# Patient Record
Sex: MALE | Race: WHITE | Employment: OTHER | URBAN - METROPOLITAN AREA
[De-identification: names, ages, dates, MRNs, and addresses within clinical notes are randomized per-mention and may not be internally consistent; named-entity substitution may affect disease eponyms.]

---

## 2017-10-04 DIAGNOSIS — M25.562 LEFT KNEE PAIN, UNSPECIFIED CHRONICITY: Primary | ICD-10-CM

## 2017-10-04 DIAGNOSIS — M25.561 RIGHT KNEE PAIN, UNSPECIFIED CHRONICITY: ICD-10-CM

## 2017-10-04 PROCEDURE — 73560 X-RAY EXAM OF KNEE 1 OR 2: CPT | Performed by: ORTHOPAEDIC SURGERY

## 2019-04-26 ENCOUNTER — HOSPITAL ENCOUNTER (OUTPATIENT)
Dept: PHYSICAL THERAPY | Age: 69
Setting detail: THERAPIES SERIES
Discharge: HOME | End: 2019-04-26
Payer: MEDICARE

## 2019-04-26 PROCEDURE — G8979 MOBILITY GOAL STATUS: HCPCS

## 2019-04-26 PROCEDURE — 97140 MANUAL THERAPY 1/> REGIONS: CPT

## 2019-04-26 PROCEDURE — 97110 THERAPEUTIC EXERCISES: CPT

## 2019-04-26 PROCEDURE — G8978 MOBILITY CURRENT STATUS: HCPCS

## 2019-04-26 PROCEDURE — G0283 ELEC STIM OTHER THAN WOUND: HCPCS

## 2019-04-26 PROCEDURE — 97161 PT EVAL LOW COMPLEX 20 MIN: CPT

## 2019-04-26 NOTE — PLAN OF CARE
infection otherwise. Will monitor. Gait: (include devices/WB status) decreased knee f/e s AD    Orthopedic Special Tests: none                       [x] Patient history, allergies, meds reviewed. Medical chart reviewed. See intake form. Review Of Systems (ROS):  [x]Performed Review of systems (Integumentary, CardioPulmonary, Neurological) by intake and observation. Intake form has been scanned into medical record. Patient has been instructed to contact their primary care physician regarding ROS issues if not already being addressed at this time. Co-morbidities/Complexities (which will affect course of rehabilitation):   []None           Arthritic conditions   []Rheumatoid arthritis (M05.9)  [x]Osteoarthritis (M19.91)- L knee   Cardiovascular conditions   [x]Hypertension (I10)  [x]Hyperlipidemia (E78.5)  []Angina pectoris (I20)  []Atherosclerosis (I70)   Musculoskeletal conditions   []Disc pathology   []Congenital spine pathologies   []Prior surgical intervention  []Osteoporosis (M81.8)  []Osteopenia (M85.8)   Endocrine conditions   []Hypothyroid (E03.9)  []Hyperthyroid Gastrointestinal conditions   []Constipation (R05.86)   Metabolic conditions   []Morbid obesity (E66.01)  []Diabetes type 1(E10.65) or 2 (E11.65)   []Neuropathy (G60.9)     Pulmonary conditions   []Asthma (J45)  []Coughing   []COPD (J44.9)   Psychological Disorders  []Anxiety (F41.9)  []Depression (F32.9)   []Other:   [x]Other:  LBP        Barriers to/and or personal factors that will affect rehab potential:              []Age  []Sex              []Motivation/Lack of Motivation                        [x]Co-Morbidities              []Cognitive Function, education/learning barriers              []Environmental, home barriers              []profession/work barriers  [x]past PT/medical experience  [x]other:fear of PROM  Justification:     Falls Risk Assessment (30 days):   [x] Falls Risk assessed and no intervention required.   [] Falls Risk then 2 days/week x 2-5 weeks  Interventions:  [x]  Therapeutic exercise including: strength training, ROM, for Lower extremity and core   [x]  NMR activation and proprioception for LE, Glutes and Core   [x]  Manual therapy as indicated for LE, Hip and spine to include: Dry Needling/IASTM, STM, PROM, Gr I-IV mobilizations, manipulation. [x] Modalities as needed that may include: thermal agents, E-stim, Biofeedback, US, iontophoresis as indicated  [x] Patient education on joint protection, postural re-education, activity modification, progression of HEP. HEP instruction: (see scanned forms)    GOALS:  Patient stated goal:Get back to normal, be able to golf, walk distances, kneel on the floor    Therapist goals for Patient:   Short Term Goals: To be achieved in: 2 weeks  1. Independent in HEP and progression per patient tolerance, in order to prevent re-injury. 2. Patient will have a decrease in pain to facilitate improvement in movement, function, and ADLs as indicated by Functional Deficits. Long Term Goals: To be achieved in: 8 weeks  1. Disability index score of 25% or less for the LEFS to assist with reaching prior level of function. 2. Patient will demonstrate increased L knee AROM to 0-115 to allow for proper joint functioning for normal gait, reciprocal stair amb, squatting and kneeling. 3. Patient will demonstrate an increase in Strength to 5/5 for the right knee and hip musculature in order to be able to walk prolonged distances, squat, and do heavier household work. 4. Patient will return to walking at least 1 mile with a normal gait pattern and back to his part-time job without limitations. 5. Patient will have improved balance to 20\" on the RLE to prevent future falls.      Electronically signed by:  Claribel Mohr, PT, DPT

## 2019-04-26 NOTE — FLOWSHEET NOTE
[facility], [address] [place]  Phone: [phone]  Fax [phone]    Physical Therapy Daily Treatment Note  Date:  2019    Patient Name:  Geovanna Veras    :  1950  MRN: [id number]  Restrictions/Precautions:    Medical/Treatment Diagnosis Information:  · Diagnosis: M17.9 Degenerative joint disease, knee, right; R TKR 19  · Treatment Diagnosis: M25.561 Right knee pain; M25.661 Right knee stiffness; R26.2 Difficulty in walking  Insurance/Certification information:  PT Insurance Information: [facility]  [facility]  Physician Information:  Referring Practitioner: NURY Mg  Plan of care signed (Y/N):     Date of Patient follow up with Physician:     G-Code (if applicable):      Date G-Code Applied:  19  PT G-Codes  Functional Assessment Tool Used: LEFS  Score: 44  Functional Limitation: Mobility: Walking and moving around  Mobility: Walking and Moving Around Current Status (): At least 40 percent but less than 60 percent impaired, limited or restricted  Mobility: Walking and Moving Around Goal Status (): At least 20 percent but less than 40 percent impaired, limited or restricted    Progress Note: [x]  Yes  []  No  Next due by: Visit #10       Latex Allergy:  [x]NO      []YES  Preferred Language for Healthcare:   [x]English       []other:    Visit # Insurance Allowable Requires auth   1 BMN    [x]no        []yes:       Pain level:  0-6/10     SUBJECTIVE:  See eval    OBJECTIVE: See eval  Observation:   Test measurements:      RESTRICTIONS/PRECAUTIONS: R TKR 2019, R THR         Exercises/Interventions:     Therapeutic Ex Sets/reps Notes HEP   Pt ed: will need to work on ROM diligently to gain functional ROM. If he'd prefer to not have PROM, then he needs to work hard at home. Rec 3x/week d/t stiffness. May decrease freq as able. SS of infection and wound care.   10'     Star stretch EOB 30\"x3  X   Heel slide with strap 5\"x10  X   HS stretch standing with step 30\"x3  X   Calf stretch standing 30\"x3  X   Prone hang 3' Add wt NV X   Quad set- propped on 1/2 FR 10\"x10  X   SLR- propped on 1/2 FR 2x10 QS each time X                     Manual Intervention      Patella mobs- all 20x ea     Post tib mob GII 15\"x4     STM vastus lateralis and IT band, VMO  Roller to IT band 5'  3'                       NMR re-education      Gait training s AD- focus on heel-toe gait 50'                                       Therapeutic Exercise and NMR EXR  [x] (20332) Provided verbal/tactile cueing for activities related to strengthening, flexibility, endurance, ROM for improvements in LE, proximal hip, and core control with self care, mobility, lifting, ambulation.  [] (51170) Provided verbal/tactile cueing for activities related to improving balance, coordination, kinesthetic sense, posture, motor skill, proprioception  to assist with LE, proximal hip, and core control in self care, mobility, lifting, ambulation and eccentric single leg control.      NMR and Therapeutic Activities:    [] (93293 or 75340) Provided verbal/tactile cueing for activities related to improving balance, coordination, kinesthetic sense, posture, motor skill, proprioception and motor activation to allow for proper function of core, proximal hip and LE with self care and ADLs  [x] (46922) Gait Re-education- Provided training and instruction to the patient for proper LE, core and proximal hip recruitment and positioning and eccentric body weight control with ambulation re-education including up and down stairs     Home Exercise Program:    [x] (10965) Reviewed/Progressed HEP activities related to strengthening, flexibility, endurance, ROM of core, proximal hip and LE for functional self-care, mobility, lifting and ambulation/stair navigation   [] (79337)Reviewed/Progressed HEP activities related to improving balance, coordination, kinesthetic sense, posture, motor skill, proprioception of core, proximal hip and LE for self care, mobility, lifting, and ambulation/stair navigation      Manual Treatments:  PROM / STM / Oscillations-Mobs:  G-I, II, III, IV (PA's, Inf., Post.)  [x] (05302) Provided manual therapy to mobilize LE, proximal hip and/or LS spine soft tissue/joints for the purpose of modulating pain, promoting relaxation,  increasing ROM, reducing/eliminating soft tissue swelling/inflammation/restriction, improving soft tissue extensibility and allowing for proper ROM for normal function with self care, mobility, lifting and ambulation. Modalities:  HV + GR x 15' R knee   Charges:  Timed Code Treatment Minutes: 30   Total Treatment Minutes: 75     [x] EVAL (LOW) 79085 (typically 20 minutes face-to-face)  [] EVAL (MOD) 80652 (typically 30 minutes face-to-face)  [] EVAL (HIGH) 82213 (typically 45 minutes face-to-face)  [] RE-EVAL     [x] JA(37730) x  1   [] IONTO  [] NMR (37206) x      [x] VASO concurrent with ES  [x] Manual (26732) x  1    [] Other:  [] TA x       [] Mech Traction (00884)  [] ES(attended) (49205)      [x] ES (un) (51464):     GOALS: Patient stated goal:Get back to normal, be able to golf, walk distances, kneel on the floor     Therapist goals for Patient:   Short Term Goals: To be achieved in: 2 weeks  1. Independent in HEP and progression per patient tolerance, in order to prevent re-injury. 2. Patient will have a decrease in pain to facilitate improvement in movement, function, and ADLs as indicated by Functional Deficits.     Long Term Goals: To be achieved in: 8 weeks  1. Disability index score of 25% or less for the LEFS to assist with reaching prior level of function. 2. Patient will demonstrate increased L knee AROM to 0-115 to allow for proper joint functioning for normal gait, reciprocal stair amb, squatting and kneeling.    3. Patient will demonstrate an increase in Strength to 5/5 for the right knee and hip musculature in order to be able to walk prolonged distances, squat, and do heavier household work. 4. Patient will return to walking at least 1 mile with a normal gait pattern and back to his part-time job without limitations. 5. Patient will have improved balance to 20\" on the RLE to prevent future falls. Progression Towards Functional goals:  [] Patient is progressing as expected towards functional goals listed. [] Progression is slowed due to complexities listed. [] Progression has been slowed due to co-morbidities.   [x] Plan just implemented, too soon to assess goals progression  [] Other:     ASSESSMENT:  See eval    Treatment/Activity Tolerance:  [] Patient tolerated treatment well [] Patient limited by fatique  [x] Patient limited by pain  [] Patient limited by other medical complications  [] Other:     Prognosis: [x] Good [] Fair  [] Poor    Patient Requires Follow-up: [x] Yes  [] No    PLAN: See eval  [] Continue per plan of care [] Alter current plan (see comments)  [x] Plan of care initiated [] Hold pending MD visit [] Discharge    Electronically signed by: Ethan Malone PT

## 2019-04-29 ENCOUNTER — HOSPITAL ENCOUNTER (OUTPATIENT)
Dept: PHYSICAL THERAPY | Age: 69
Setting detail: THERAPIES SERIES
Discharge: HOME | End: 2019-04-29
Payer: MEDICARE

## 2019-04-29 PROCEDURE — 97140 MANUAL THERAPY 1/> REGIONS: CPT

## 2019-04-29 PROCEDURE — G0283 ELEC STIM OTHER THAN WOUND: HCPCS

## 2019-04-29 PROCEDURE — 97110 THERAPEUTIC EXERCISES: CPT

## 2019-04-29 NOTE — FLOWSHEET NOTE
[facility], [address] [place] [address]  Phone: [phone]  Fax [phone]    Physical Therapy Daily Treatment Note  Date:  2019    Patient Name:  Mirza Hernandez    :  1950  MRN: [id number]  Restrictions/Precautions:    Medical/Treatment Diagnosis Information:  · Diagnosis: M17.9 Degenerative joint disease, knee, right; R TKR 19  · Treatment Diagnosis: M25.561 Right knee pain; M25.661 Right knee stiffness; R26.2 Difficulty in walking  Insurance/Certification information:  PT Insurance Information: [facility]  [facility]  Physician Information:  Referring Practitioner: NURY Craig  Plan of care signed (Y/N):     Date of Patient follow up with Physician:     G-Code (if applicable):      Date G-Code Applied:  19  PT G-Codes  Functional Assessment Tool Used: LEFS  Score: 44  Functional Limitation: Mobility: Walking and moving around  Mobility: Walking and Moving Around Current Status (): At least 40 percent but less than 60 percent impaired, limited or restricted  Mobility: Walking and Moving Around Goal Status (): At least 20 percent but less than 40 percent impaired, limited or restricted    Progress Note: [x]  Yes  []  No  Next due by: Visit #10       Latex Allergy:  [x]NO      []YES  Preferred Language for Healthcare:   [x]English       []other:    Visit # Insurance Allowable Requires auth   2 BMN    [x]no        []yes:       Pain level:  0-6/10     SUBJECTIVE: Pt reports no new complaints. Was able to stretch twice each day over the weekend. Has a point of pain when he bends his knee, but once he gets past it, he feels better.      OBJECTIVE:   Observation: incision healing improved since LV  Test measurements:  Knee flexion 81 deg after esau stretch, improves to 87 after AAROM/PROM  Knee ext 2 deg after prone hang, improves to 0 after PROM    RESTRICTIONS/PRECAUTIONS: R TKR 2019, R THR 2007        Exercises/Interventions:     Therapeutic Ex Sets/reps Notes HEP   Pt ed: still stretch 3x/day even on days in therap 2'     Star stretch EOB 30\"x3  X   Heel slide with strap on SWB  On mat 5\"x10  \"   PROM last 3 X   HS stretch standing with step 30\"x3  X   Calf stretch standing on incline board 30\"x3  X   Prone hang 3' 3# X   Quad set- propped on 1/2 FR 10\"x10  X   SAQ 3\" 2x10     LAQ 3\"2x10     SLR- propped on 1/2 FR 2x10 QS each time X   Bridge 3\"x15     Step up fwd  Up and over 2x10   x10 4\"                            EZ stretch flex, ext NV     Manual Intervention      Patella mobs- all 20x ea     Post tib mob GII 15\"x4     STM vastus lateralis and IT band, VMO  Roller to IT band 5'  3'                       NMR re-education      Gait training s AD- focus on heel-toe gait                                        Therapeutic Exercise and NMR EXR  [x] (80000) Provided verbal/tactile cueing for activities related to strengthening, flexibility, endurance, ROM for improvements in LE, proximal hip, and core control with self care, mobility, lifting, ambulation.  [] (23596) Provided verbal/tactile cueing for activities related to improving balance, coordination, kinesthetic sense, posture, motor skill, proprioception  to assist with LE, proximal hip, and core control in self care, mobility, lifting, ambulation and eccentric single leg control.      NMR and Therapeutic Activities:    [] (29916 or 11584) Provided verbal/tactile cueing for activities related to improving balance, coordination, kinesthetic sense, posture, motor skill, proprioception and motor activation to allow for proper function of core, proximal hip and LE with self care and ADLs  [x] (49131) Gait Re-education- Provided training and instruction to the patient for proper LE, core and proximal hip recruitment and positioning and eccentric body weight control with ambulation re-education including up and down stairs     Home Exercise Program: [x] (46276) Reviewed/Progressed HEP activities related to strengthening, flexibility, endurance, ROM of core, proximal hip and LE for functional self-care, mobility, lifting and ambulation/stair navigation   [] (47369)Reviewed/Progressed HEP activities related to improving balance, coordination, kinesthetic sense, posture, motor skill, proprioception of core, proximal hip and LE for self care, mobility, lifting, and ambulation/stair navigation      Manual Treatments:  PROM / STM / Oscillations-Mobs:  G-I, II, III, IV (PA's, Inf., Post.)  [x] (66522) Provided manual therapy to mobilize LE, proximal hip and/or LS spine soft tissue/joints for the purpose of modulating pain, promoting relaxation,  increasing ROM, reducing/eliminating soft tissue swelling/inflammation/restriction, improving soft tissue extensibility and allowing for proper ROM for normal function with self care, mobility, lifting and ambulation. Modalities:  HV + GR x 15' R knee   Charges:  Timed Code Treatment Minutes: 45   Total Treatment Minutes: 60     [] EVAL (LOW) 84567 (typically 20 minutes face-to-face)  [] EVAL (MOD) 86569 (typically 30 minutes face-to-face)  [] EVAL (HIGH) 78652 (typically 45 minutes face-to-face)  [] RE-EVAL     [x] GX(10683) x  2   [] IONTO  [] NMR (02342) x      [x] VASO concurrent with ES  [x] Manual (58252) x  1    [] Other:  [] TA x       [] Mech Traction (67011)  [] ES(attended) (23793)      [x] ES (un) (14920):     GOALS: Patient stated goal:Get back to normal, be able to golf, walk distances, kneel on the floor     Therapist goals for Patient:   Short Term Goals: To be achieved in: 2 weeks  1. Independent in HEP and progression per patient tolerance, in order to prevent re-injury. 2. Patient will have a decrease in pain to facilitate improvement in movement, function, and ADLs as indicated by Functional Deficits.     Long Term Goals: To be achieved in: 8 weeks  1.  Disability index score of 25% or less for the LEFS to assist with reaching prior level of function. 2. Patient will demonstrate increased L knee AROM to 0-115 to allow for proper joint functioning for normal gait, reciprocal stair amb, squatting and kneeling. 3. Patient will demonstrate an increase in Strength to 5/5 for the right knee and hip musculature in order to be able to walk prolonged distances, squat, and do heavier household work. 4. Patient will return to walking at least 1 mile with a normal gait pattern and back to his part-time job without limitations. 5. Patient will have improved balance to 20\" on the RLE to prevent future falls. Progression Towards Functional goals:  [] Patient is progressing as expected towards functional goals listed. [] Progression is slowed due to complexities listed. [] Progression has been slowed due to co-morbidities. [x] Plan just implemented, too soon to assess goals progression  [] Other:     ASSESSMENT:Pt has made gains in ROM since stretching regularly with HEP. However, he will benefit from using the EZ stretch NV.      Treatment/Activity Tolerance:  [] Patient tolerated treatment well [] Patient limited by fatique  [x] Patient limited by pain  [] Patient limited by other medical complications  [] Other:     Prognosis: [x] Good [] Fair  [] Poor    Patient Requires Follow-up: [x] Yes  [] No    PLAN: EZ stretch NV  [x] Continue per plan of care [] Alter current plan (see comments)  [] Plan of care initiated [] Hold pending MD visit [] Discharge    Electronically signed by: Goyo Lowe, PT

## 2019-05-01 ENCOUNTER — HOSPITAL ENCOUNTER (OUTPATIENT)
Dept: PHYSICAL THERAPY | Age: 69
Setting detail: THERAPIES SERIES
Discharge: HOME | End: 2019-05-01
Payer: COMMERCIAL

## 2019-05-01 PROCEDURE — 97110 THERAPEUTIC EXERCISES: CPT

## 2019-05-01 PROCEDURE — 97140 MANUAL THERAPY 1/> REGIONS: CPT

## 2019-05-01 NOTE — FLOWSHEET NOTE
[facility]  Phone: [phone]  Fax [phone]    Physical Therapy Daily Treatment Note  Date:  2019    Patient Name:  Jordyn Donahue    :  1950  MRN: [id number]  Restrictions/Precautions:    Medical/Treatment Diagnosis Information:  · Diagnosis: M17.9 Degenerative joint disease, knee, right; R TKR 19  · Treatment Diagnosis: M25.561 Right knee pain; M25.661 Right knee stiffness; R26.2 Difficulty in walking  Insurance/Certification information:  PT Insurance Information: [facility]  [facility]  Physician Information:  Referring Practitioner: NURY Fine  Plan of care signed (Y/N):     Date of Patient follow up with Physician:     G-Code (if applicable):      Date G-Code Applied:  19  PT G-Codes  Functional Assessment Tool Used: LEFS  Score: 44  Functional Limitation: Mobility: Walking and moving around  Mobility: Walking and Moving Around Current Status (): At least 40 percent but less than 60 percent impaired, limited or restricted  Mobility: Walking and Moving Around Goal Status (): At least 20 percent but less than 40 percent impaired, limited or restricted    Progress Note: [x]  Yes  []  No  Next due by: Visit #10       Latex Allergy:  [x]NO      []YES  Preferred Language for Healthcare:   [x]English       []other:    Visit # Insurance Allowable Requires auth   3 BMN    [x]no        []yes:       Pain level:  0-6/10     SUBJECTIVE: Pt reports that he did not stretch today. His knee is feeling better as it is loosening up.      OBJECTIVE:   Observation: incision healing improved since LV  Test measurements:  Knee flexion 78 deg pre-treatment, improves to 93 after AAROM/PROM  Knee ext 2 deg after prone hang, improves to 0 after prone hang    RESTRICTIONS/PRECAUTIONS: R TKR 2019, R THR 2007        Exercises/Interventions:     Therapeutic Ex Sets/reps Notes HEP   Pt ed: of core, proximal hip and LE for functional self-care, mobility, lifting and ambulation/stair navigation   [] (48118)Reviewed/Progressed HEP activities related to improving balance, coordination, kinesthetic sense, posture, motor skill, proprioception of core, proximal hip and LE for self care, mobility, lifting, and ambulation/stair navigation      Manual Treatments:  PROM / STM / Oscillations-Mobs:  G-I, II, III, IV (PA's, Inf., Post.)  [x] (39175) Provided manual therapy to mobilize LE, proximal hip and/or LS spine soft tissue/joints for the purpose of modulating pain, promoting relaxation,  increasing ROM, reducing/eliminating soft tissue swelling/inflammation/restriction, improving soft tissue extensibility and allowing for proper ROM for normal function with self care, mobility, lifting and ambulation. Modalities:  CP x 10' R knee  Charges:  Timed Code Treatment Minutes: 50   Total Treatment Minutes: 70 (CP, rest breaks)     [] EVAL (LOW) 31972 (typically 20 minutes face-to-face)  [] EVAL (MOD) 32951 (typically 30 minutes face-to-face)  [] EVAL (HIGH) 80359 (typically 45 minutes face-to-face)  [] RE-EVAL     [x] HD(33479) x  2   [] IONTO  [] NMR (26174) x      [x] VASO concurrent with ES  [x] Manual (60997) x  1    [] Other:  [] TA x       [] Mech Traction (45825)  [] ES(attended) (34249)      [x] ES (un) (21833):     GOALS: Patient stated goal:Get back to normal, be able to golf, walk distances, kneel on the floor     Therapist goals for Patient:   Short Term Goals: To be achieved in: 2 weeks  1. Independent in HEP and progression per patient tolerance, in order to prevent re-injury. 2. Patient will have a decrease in pain to facilitate improvement in movement, function, and ADLs as indicated by Functional Deficits.     Long Term Goals: To be achieved in: 8 weeks  1. Disability index score of 25% or less for the LEFS to assist with reaching prior level of function.    2. Patient will demonstrate

## 2019-05-03 ENCOUNTER — HOSPITAL ENCOUNTER (OUTPATIENT)
Dept: PHYSICAL THERAPY | Age: 69
Setting detail: THERAPIES SERIES
Discharge: HOME | End: 2019-05-03
Payer: COMMERCIAL

## 2019-05-03 PROCEDURE — 97110 THERAPEUTIC EXERCISES: CPT

## 2019-05-03 PROCEDURE — 97140 MANUAL THERAPY 1/> REGIONS: CPT

## 2019-05-03 NOTE — FLOWSHEET NOTE
stretch 3x/day even on days in therap 2'     Star stretch EOB 60\"x2  X   Heel slide with strap on SWB  On mat 5\"x10  \"   PROM last 3 X   HS stretch on mat  30\"x3  X   Calf stretch standing on incline board   X   Prone hang 3' 3# X   Quad set- propped on 1/2 FR 10\"x10  X   SAQ     LAQ 3\"2x10    SLR- propped on 1/2 FR QS each time X   Bridge     Step up fwd, lateral   Up and over 2x10    8\"  4\"    Mini squat 2x10 UE support, chair behind for target    Side step 4x40' OVL above knees                EZ stretch flex, ext 3x1' ea indep after 1' instruction    Manual Intervention      Patella mobs- all 20x ea     Post tib mob GII 15\"x4     STM vastus lateralis and IT band, VMO  Roller to IT band 5'       PROM flexion- seated 4'                 NMR re-education      Gait training s AD- focus on heel-toe gait                                        Therapeutic Exercise and NMR EXR  [x] (27414) Provided verbal/tactile cueing for activities related to strengthening, flexibility, endurance, ROM for improvements in LE, proximal hip, and core control with self care, mobility, lifting, ambulation.  [] (99977) Provided verbal/tactile cueing for activities related to improving balance, coordination, kinesthetic sense, posture, motor skill, proprioception  to assist with LE, proximal hip, and core control in self care, mobility, lifting, ambulation and eccentric single leg control.      NMR and Therapeutic Activities:    [] (05197 or 36048) Provided verbal/tactile cueing for activities related to improving balance, coordination, kinesthetic sense, posture, motor skill, proprioception and motor activation to allow for proper function of core, proximal hip and LE with self care and ADLs  [x] (90823) Gait Re-education- Provided training and instruction to the patient for proper LE, core and proximal hip recruitment and positioning and eccentric body weight control with ambulation re-education including up and down stairs     Home Exercise Program:    [x] (90898) Reviewed/Progressed HEP activities related to strengthening, flexibility, endurance, ROM of core, proximal hip and LE for functional self-care, mobility, lifting and ambulation/stair navigation   [] (93266)Reviewed/Progressed HEP activities related to improving balance, coordination, kinesthetic sense, posture, motor skill, proprioception of core, proximal hip and LE for self care, mobility, lifting, and ambulation/stair navigation      Manual Treatments:  PROM / STM / Oscillations-Mobs:  G-I, II, III, IV (PA's, Inf., Post.)  [x] (51358) Provided manual therapy to mobilize LE, proximal hip and/or LS spine soft tissue/joints for the purpose of modulating pain, promoting relaxation,  increasing ROM, reducing/eliminating soft tissue swelling/inflammation/restriction, improving soft tissue extensibility and allowing for proper ROM for normal function with self care, mobility, lifting and ambulation. Modalities:  Declined, will ice at home  Charges:  Timed Code Treatment Minutes: 40   Total Treatment Minutes: 60 ( rest breaks, indep exercise)     [] EVAL (LOW) 52739 (typically 20 minutes face-to-face)  [] EVAL (MOD) 67105 (typically 30 minutes face-to-face)  [] EVAL (HIGH) 72728 (typically 45 minutes face-to-face)  [] RE-EVAL     [x] JJ(97293) x  2   [] IONTO  [] NMR (41100) x      [] VASO concurrent with ES  [x] Manual (94459) x  1    [] Other:  [] TA x       [] Mech Traction (64152)  [] ES(attended) (34538)      [] ES (un) (39844):     GOALS: Patient stated goal:Get back to normal, be able to golf, walk distances, kneel on the floor     Therapist goals for Patient:   Short Term Goals: To be achieved in: 2 weeks  1. Independent in HEP and progression per patient tolerance, in order to prevent re-injury. 2. Patient will have a decrease in pain to facilitate improvement in movement, function, and ADLs as indicated by Functional Deficits.     Long Term Goals:  To be achieved in: 8 weeks  1. Disability index score of 25% or less for the LEFS to assist with reaching prior level of function. 2. Patient will demonstrate increased L knee AROM to 0-115 to allow for proper joint functioning for normal gait, reciprocal stair amb, squatting and kneeling. 3. Patient will demonstrate an increase in Strength to 5/5 for the right knee and hip musculature in order to be able to walk prolonged distances, squat, and do heavier household work. 4. Patient will return to walking at least 1 mile with a normal gait pattern and back to his part-time job without limitations. 5. Patient will have improved balance to 20\" on the RLE to prevent future falls. Progression Towards Functional goals:  [] Patient is progressing as expected towards functional goals listed. [] Progression is slowed due to complexities listed. [] Progression has been slowed due to co-morbidities. [x] Plan just implemented, too soon to assess goals progression  [] Other:     ASSESSMENT:Pt continues to progress with ROM each visit. Needs cues for LE alignment with CKC strengthening. Treatment/Activity Tolerance:  [] Patient tolerated treatment well [] Patient limited by fatique  [x] Patient limited by pain  [] Patient limited by other medical complications  [] Other:     Prognosis: [x] Good [] Fair  [] Poor    Patient Requires Follow-up: [x] Yes  [] No    PLAN: Cont to restore knee ROM, bike NV?   [x] Continue per plan of care [] Alter current plan (see comments)  [] Plan of care initiated [] Hold pending MD visit [] Discharge    Electronically signed by: Adilia Sandoval, PT, DPT

## 2019-05-06 ENCOUNTER — HOSPITAL ENCOUNTER (OUTPATIENT)
Dept: PHYSICAL THERAPY | Age: 69
Setting detail: THERAPIES SERIES
Discharge: HOME | End: 2019-05-06
Payer: COMMERCIAL

## 2019-05-06 PROCEDURE — 97110 THERAPEUTIC EXERCISES: CPT

## 2019-05-06 PROCEDURE — 97140 MANUAL THERAPY 1/> REGIONS: CPT

## 2019-05-06 NOTE — FLOWSHEET NOTE
[facility], [address] [place] [address]  Phone: [phone]  Fax [phone]    Physical Therapy Daily Treatment Note  Date:  2019    Patient Name:  Celine Obrien    :  1950  MRN: [id number]  Restrictions/Precautions:    Medical/Treatment Diagnosis Information:  · Diagnosis: M17.9 Degenerative joint disease, knee, right; R TKR 19  · Treatment Diagnosis: M25.561 Right knee pain; M25.661 Right knee stiffness; R26.2 Difficulty in walking  Insurance/Certification information:  PT Insurance Information: Juany Yung [facility]  Physician Information:  Referring Practitioner: NURY Morataya  Plan of care signed (Y/N):     Date of Patient follow up with Physician:     G-Code (if applicable):      Date G-Code Applied:  19  PT G-Codes  Functional Assessment Tool Used: LEFS  Score: 44  Functional Limitation: Mobility: Walking and moving around  Mobility: Walking and Moving Around Current Status (): At least 40 percent but less than 60 percent impaired, limited or restricted  Mobility: Walking and Moving Around Goal Status (): At least 20 percent but less than 40 percent impaired, limited or restricted    Progress Note: [x]  Yes  []  No  Next due by: Visit #10       Latex Allergy:  [x]NO      []YES  Preferred Language for Healthcare:   [x]English       []other:    Visit # Insurance Allowable Requires auth   5 BMN    [x]no        []yes:       Pain level:  0-5/10     SUBJECTIVE: Pt reports that he still has difficulty bending his knee to get into the car. Has a stabbing pain when trying to bend his knee underneath his knee cap. Once he gets past the point where this stabbing pain occurs, he is okay.      OBJECTIVE:   Observation: incision healing improved since LV  Test measurements:  Knee flexion 95 deg pre-treatment, improves to  after AAROM/PROM  Knee ext 2 deg after prone hang, improves to 0 after prone hang    RESTRICTIONS/PRECAUTIONS: R TKR 4/9/2019, R THR 2007        Exercises/Interventions:     Therapeutic Ex Sets/reps Notes HEP   Pt ed: still stretch 3x/day even on days in therapy 2'     Star stretch EOB 60\"x2  X   Heel slide with strap on SWB  On mat 5\"x10  \"   PROM last 3 X   HS stretch on mat  30\"x3  X   Calf stretch standing on incline board 30\"x3  X   Prone hang 3' 3# X   Quad set- propped on 1/2 FR 10\"x10  X   SAQ 3\" 2x101#    LAQ 3\"2x101#    SLR- propped on 1/2 FR QS each time X   Bridge     Step up fwd, lateral     Up and over x15 ea    6\" with mirror, cues to avoid knee valgus  4\"    Mini squat 2x10 UE support, chair behind for target    Side step 4x40' OVL above knees          Bike - rocking 5' Unable to revolve    EZ stretch flex, ext  indep after 1' instruction    Manual Intervention      Patella mobs- all 20x ea     Post tib mob GII 15\"x4     STM vastus lateralis and IT band, VMO  Roller to IT band 5'       PROM flexion- seated 4'                 NMR re-education      Gait training s AD- focus on heel-toe gait                                        Therapeutic Exercise and NMR EXR  [x] (39886) Provided verbal/tactile cueing for activities related to strengthening, flexibility, endurance, ROM for improvements in LE, proximal hip, and core control with self care, mobility, lifting, ambulation.  [] (67036) Provided verbal/tactile cueing for activities related to improving balance, coordination, kinesthetic sense, posture, motor skill, proprioception  to assist with LE, proximal hip, and core control in self care, mobility, lifting, ambulation and eccentric single leg control.      NMR and Therapeutic Activities:    [] (58006 or 27535) Provided verbal/tactile cueing for activities related to improving balance, coordination, kinesthetic sense, posture, motor skill, proprioception and motor activation to allow for proper function of core, proximal hip and LE with self care and ADLs  [x] (71639) Gait Re-education- Provided training and instruction to the patient for proper LE, core and proximal hip recruitment and positioning and eccentric body weight control with ambulation re-education including up and down stairs     Home Exercise Program:    [x] (62055) Reviewed/Progressed HEP activities related to strengthening, flexibility, endurance, ROM of core, proximal hip and LE for functional self-care, mobility, lifting and ambulation/stair navigation   [] (49807)Reviewed/Progressed HEP activities related to improving balance, coordination, kinesthetic sense, posture, motor skill, proprioception of core, proximal hip and LE for self care, mobility, lifting, and ambulation/stair navigation      Manual Treatments:  PROM / STM / Oscillations-Mobs:  G-I, II, III, IV (PA's, Inf., Post.)  [x] (81132) Provided manual therapy to mobilize LE, proximal hip and/or LS spine soft tissue/joints for the purpose of modulating pain, promoting relaxation,  increasing ROM, reducing/eliminating soft tissue swelling/inflammation/restriction, improving soft tissue extensibility and allowing for proper ROM for normal function with self care, mobility, lifting and ambulation. Modalities:  Declined, will ice at home  Charges:  Timed Code Treatment Minutes: 40   Total Treatment Minutes: 60 ( rest breaks, indep exercise)     [] EVAL (LOW) 71392 (typically 20 minutes face-to-face)  [] EVAL (MOD) 85275 (typically 30 minutes face-to-face)  [] EVAL (HIGH) 26589 (typically 45 minutes face-to-face)  [] RE-EVAL     [x] ZD(57313) x  2   [] IONTO  [] NMR (20086) x      [] VASO concurrent with ES  [x] Manual (67923) x  1    [] Other:  [] TA x       [] Mech Traction (66489)  [] ES(attended) (25294)      [] ES (un) (81951):     GOALS: Patient stated goal:Get back to normal, be able to golf, walk distances, kneel on the floor     Therapist goals for Patient:   Short Term Goals: To be achieved in: 2 weeks  1.  Independent in HEP and progression per patient tolerance, in order to prevent re-injury. 2. Patient will have a decrease in pain to facilitate improvement in movement, function, and ADLs as indicated by Functional Deficits.     Long Term Goals: To be achieved in: 8 weeks  1. Disability index score of 25% or less for the LEFS to assist with reaching prior level of function. 2. Patient will demonstrate increased L knee AROM to 0-115 to allow for proper joint functioning for normal gait, reciprocal stair amb, squatting and kneeling. 3. Patient will demonstrate an increase in Strength to 5/5 for the right knee and hip musculature in order to be able to walk prolonged distances, squat, and do heavier household work. 4. Patient will return to walking at least 1 mile with a normal gait pattern and back to his part-time job without limitations. 5. Patient will have improved balance to 20\" on the RLE to prevent future falls. Progression Towards Functional goals:  [] Patient is progressing as expected towards functional goals listed. [] Progression is slowed due to complexities listed. [] Progression has been slowed due to co-morbidities. [x] Plan just implemented, too soon to assess goals progression  [] Other:     ASSESSMENT:Pt continues to progress with ROM each visit. Needs cues for LE alignment with CKC strengthening. Treatment/Activity Tolerance:  [] Patient tolerated treatment well [] Patient limited by fatique  [x] Patient limited by pain  [] Patient limited by other medical complications  [] Other:     Prognosis: [x] Good [] Fair  [] Poor    Patient Requires Follow-up: [x] Yes  [] No    PLAN: Cont to restore knee ROM, bike NV. Could try weights NV.    [x] Continue per plan of care [] Alter current plan (see comments)  [] Plan of care initiated [] Hold pending MD visit [] Discharge    Electronically signed by: Tyler Enriquez, PT, DPT

## 2019-05-08 ENCOUNTER — HOSPITAL ENCOUNTER (OUTPATIENT)
Dept: PHYSICAL THERAPY | Age: 69
Setting detail: THERAPIES SERIES
Discharge: HOME | End: 2019-05-08
Payer: COMMERCIAL

## 2019-05-08 PROCEDURE — 97140 MANUAL THERAPY 1/> REGIONS: CPT

## 2019-05-08 PROCEDURE — 97110 THERAPEUTIC EXERCISES: CPT

## 2019-05-08 NOTE — FLOWSHEET NOTE
[facility], [address] [place] [address]  Phone: [phone]  Fax [phone]    Physical Therapy Daily Treatment Note  Date:  2019    Patient Name:  Lola Bhatia    :  1950  MRN: [id number]  Restrictions/Precautions:    Medical/Treatment Diagnosis Information:  · Diagnosis: M17.9 Degenerative joint disease, knee, right; R TKR 19  · Treatment Diagnosis: M25.561 Right knee pain; M25.661 Right knee stiffness; R26.2 Difficulty in walking  Insurance/Certification information:  PT Insurance Information: Claudia Bae [facility]  Physician Information:  Referring Practitioner: NURY Dia  Plan of care signed (Y/N):     Date of Patient follow up with Physician:     G-Code (if applicable):      Date G-Code Applied:  19  PT G-Codes  Functional Assessment Tool Used: LEFS  Score: 44  Functional Limitation: Mobility: Walking and moving around  Mobility: Walking and Moving Around Current Status (): At least 40 percent but less than 60 percent impaired, limited or restricted  Mobility: Walking and Moving Around Goal Status (): At least 20 percent but less than 40 percent impaired, limited or restricted    Progress Note: [x]  Yes  []  No  Next due by: Visit #10       Latex Allergy:  [x]NO      []YES  Preferred Language for Healthcare:   [x]English       []other:    Visit # Insurance Allowable Requires auth   6 BMN    [x]no        []yes:       Pain level:  4/10     SUBJECTIVE: Pt reports a dull ache on the outside of his knee, is not sure why it is sore today. He has noticed an improvement in his ability to get in the car and descend stairs recently.    OBJECTIVE:   Observation: incision healing improved since LV  Test measurements:  Knee flexion 95 deg pre-treatment, improves to 98 after AAROM/PROM  Knee ext 2 deg after prone hang, improves to 0 after prone hang    RESTRICTIONS/PRECAUTIONS: R TKR 2019, R THR 2007        Exercises/Interventions:     Therapeutic Ex Sets/reps Notes HEP   Pt ed: still stretch 3x/day even on days in therapy 2'     Star stretch EOB 60\"x2  X   Heel slide with strap on SWB  On mat 5\"x10  \"   PROM last 3 X   HS stretch on mat  30\"x3  X   Calf stretch standing on incline board 30\"x3  X   Prone hang 3' 3# X   Quad set- propped on 1/2 FR 10\"x10  X   SAQ 3\" 2x101#    LAQ 3\"2x101#    SLR- propped on 1/2 FR 2x101# QS each time X   Bridge     Step up fwd, lateral     Up and over x15 ea    6\" with mirror, cues to avoid knee valgus  4\"    Mini squat  UE support, chair behind for target    Side step 4x40' OVL above knees          Bike - rocking  Unable to revolve    EZ stretch flex, ext  indep after 1' instruction    Manual Intervention      Patella mobs- all 20x ea     Post tib mob GII 15\"x4     STM vastus lateralis and IT band, VMO  Roller to IT band 5'       PROM flexion- seated 4'                 NMR re-education      Gait training s AD- focus on heel-toe gait                                        Therapeutic Exercise and NMR EXR  [x] (62738) Provided verbal/tactile cueing for activities related to strengthening, flexibility, endurance, ROM for improvements in LE, proximal hip, and core control with self care, mobility, lifting, ambulation.  [] (19402) Provided verbal/tactile cueing for activities related to improving balance, coordination, kinesthetic sense, posture, motor skill, proprioception  to assist with LE, proximal hip, and core control in self care, mobility, lifting, ambulation and eccentric single leg control.      NMR and Therapeutic Activities:    [] (25875 or 18851) Provided verbal/tactile cueing for activities related to improving balance, coordination, kinesthetic sense, posture, motor skill, proprioception and motor activation to allow for proper function of core, proximal hip and LE with self care and ADLs  [x] (23378) Gait Re-education- Provided training and instruction to the patient for proper LE, core and proximal hip recruitment and positioning and eccentric body weight control with ambulation re-education including up and down stairs     Home Exercise Program:    [x] (76543) Reviewed/Progressed HEP activities related to strengthening, flexibility, endurance, ROM of core, proximal hip and LE for functional self-care, mobility, lifting and ambulation/stair navigation   [] (78335)Reviewed/Progressed HEP activities related to improving balance, coordination, kinesthetic sense, posture, motor skill, proprioception of core, proximal hip and LE for self care, mobility, lifting, and ambulation/stair navigation      Manual Treatments:  PROM / STM / Oscillations-Mobs:  G-I, II, III, IV (PA's, Inf., Post.)  [x] (49330) Provided manual therapy to mobilize LE, proximal hip and/or LS spine soft tissue/joints for the purpose of modulating pain, promoting relaxation,  increasing ROM, reducing/eliminating soft tissue swelling/inflammation/restriction, improving soft tissue extensibility and allowing for proper ROM for normal function with self care, mobility, lifting and ambulation. Modalities:  Declined, will ice at home  Charges:  Timed Code Treatment Minutes: 40   Total Treatment Minutes: 55 ( rest breaks, indep exercise)     [] EVAL (LOW) 38563 (typically 20 minutes face-to-face)  [] EVAL (MOD) 56580 (typically 30 minutes face-to-face)  [] EVAL (HIGH) 84329 (typically 45 minutes face-to-face)  [] RE-EVAL     [x] DB(18580) x  2   [] IONTO  [] NMR (50657) x      [] VASO concurrent with ES  [x] Manual (44230) x  1    [] Other:  [] TA x       [] Mech Traction (02300)  [] ES(attended) (11629)      [] ES (un) (37130):     GOALS: Patient stated goal:Get back to normal, be able to golf, walk distances, kneel on the floor     Therapist goals for Patient:   Short Term Goals: To be achieved in: 2 weeks  1. Independent in HEP and progression per patient tolerance, in order to prevent re-injury.    2. Patient will have a decrease in pain to facilitate improvement in movement, function, and ADLs as indicated by Functional Deficits.     Long Term Goals: To be achieved in: 8 weeks  1. Disability index score of 25% or less for the LEFS to assist with reaching prior level of function. 2. Patient will demonstrate increased L knee AROM to 0-115 to allow for proper joint functioning for normal gait, reciprocal stair amb, squatting and kneeling. 3. Patient will demonstrate an increase in Strength to 5/5 for the right knee and hip musculature in order to be able to walk prolonged distances, squat, and do heavier household work. 4. Patient will return to walking at least 1 mile with a normal gait pattern and back to his part-time job without limitations. 5. Patient will have improved balance to 20\" on the RLE to prevent future falls. Progression Towards Functional goals:  [] Patient is progressing as expected towards functional goals listed. [x] Progression is slowed due to complexities listed. [] Progression has been slowed due to co-morbidities. [] Plan just implemented, too soon to assess goals progression  [] Other:     ASSESSMENT:Pt's ROM a little stiffer than LV into flexion. Reminded pt to stretch 3x/day at least. Added EZ stretch back into routine for flexion stretching with AT. Needs cues for LE alignment with CKC strengthening.      Treatment/Activity Tolerance:  [] Patient tolerated treatment well [] Patient limited by fatique  [x] Patient limited by pain  [] Patient limited by other medical complications  [] Other:     Prognosis: [x] Good [] Fair  [] Poor    Patient Requires Follow-up: [x] Yes  [] No    PLAN: Cont to restore knee ROM, EZ stretch vs bike  [x] Continue per plan of care [] Alter current plan (see comments)  [] Plan of care initiated [] Hold pending MD visit [] Discharge    Electronically signed by: Patsy Sexton, PT, DPT

## 2019-05-08 NOTE — FLOWSHEET NOTE
[facility], [address] [place]  Phone: [phone]  Fax [phone]      ATHLETIC TRAINING [address]  Date:  2019    Patient Name:  Severo Belt  \"Sandro\" :  1950  MRN: [id number]  Restrictions/Precautions:    Medical/Treatment Diagnosis Information:  ·  R knee DJD s/p R TKR  DOS 19  ·  R knee pain, stiffness, difficulty walking  Physician Information:   NURY Pitts    Weeks Post-op  8 wks  12 wks 16 wks 20 wks   24 wks                            Activity Log                                                  DOS/DOI:                                                    Date: 19    ATC communication Post EZS ROM 0-2-103   Bike    Elliptical    Treadmill    Airdyne        Gastroc stretch    Soleus stretch    Hamstring stretch    ITB stretch    Hip Flexor stretch    Quad stretch    Adductor stretch    EZS flex/ext 3x1' ea       Weight Shifting sp                              fp                              tp    Lateral walking (with/w/o TB)        Balance: PEP/Katlin board                   SLS          Star excursion load/explode          Extremity reach UE/LE        Leg Press Constantino. IH 80# 3x10                     Ecc.                      Inv. Calf Press Constantino. IH 80# 20x                      Ecc.                        Inv.        RACHEL   Flex               ABd 45# R/L 2x10              ADd              TKE 45# 20x5\"              Ext        Steps Up               Up and Over               Down               Lateral               Rotation        Squats  mini                  wall                 [facility]         Lunges:  Lunge to Balance                   Balance to Lunge                   Walking        Knee Extension Bilat. Ecc.                               Inv. Hamstring Curls Bilat.  30# 2x10 [facility]                              Ecc.

## 2019-05-10 ENCOUNTER — HOSPITAL ENCOUNTER (OUTPATIENT)
Dept: PHYSICAL THERAPY | Age: 69
Setting detail: THERAPIES SERIES
Discharge: HOME | End: 2019-05-10
Payer: COMMERCIAL

## 2019-05-10 PROCEDURE — 97140 MANUAL THERAPY 1/> REGIONS: CPT

## 2019-05-10 PROCEDURE — 97110 THERAPEUTIC EXERCISES: CPT

## 2019-05-10 NOTE — FLOWSHEET NOTE
[facility],  [address] [place]  Phone: [phone]  Fax [phone]      ATHLETIC TRAINING [address]  Date:  5/10/2019    Patient Name:  Yamilet Malik  \"Sandro\" :  1950  MRN: [id number]  Restrictions/Precautions:    Medical/Treatment Diagnosis Information:  ·  R knee DJD s/p R TKR  DOS 19  ·  R knee pain, stiffness, difficulty walking  Physician Information:   NURY Francis    Weeks Post-op  8 wks  12 wks 16 wks 20 wks   24 wks                            Activity Log                                                  DOS/DOI:                                                    Date: 5/8/19  5/10/19   ATC communication Post EZS ROM 0-2-103 Post EZS: 107   Bike     Elliptical     Treadmill     Airdyne          Gastroc stretch     Soleus stretch     Hamstring stretch     ITB stretch     Hip Flexor stretch     Quad stretch     Adductor stretch     EZS flex/ext 3x1' ea Flex 5'        Weight Shifting sp                               fp                               tp     Lateral walking (with/w/o TB)          Balance: PEP/Katlin board                    SLS           Star excursion load/explode           Extremity reach UE/LE          Leg Press Constantino. IH 80# 3x10 IH 80# 3x10                     Ecc.  IH 60# 2x10                     Inv. Calf Press Constantino. IH 80# 20x IH 80# 3x10                      Ecc.                         Inv.          RACHEL   Flex                ABd 45# R/L 2x10 45# R/L 3x10 (^NV)              ADd               TKE 45# 20x5\" 60# 20x5\"              Ext  45# R/L 3x10 (^NV)        Steps Up                Up and Over                Down                Lateral                Rotation          Squats  mini                   wall                  BOSU           Lunges:  Lunge to Balance                    Balance to Lunge                    Walking          Knee Extension Bilat. Ecc.                                Inv. Hamstring Curls Bilat. 30# 2x10 south 40# 3x10 north (^NV)                              Ecc.                                Inv.          Soleus Press Bilat. Ecc.                            Inv.                                Ladders                 Square                Jump/Hop  Low                       Med.                       High                                                               Modality declined-home declined   Initials                             JLW DTM   Time spent one on one (workers comp)     Time spent with PT assistant

## 2019-05-10 NOTE — FLOWSHEET NOTE
[facility], [address] [place] [address]  Phone: [phone]  Fax [phone]    Physical Therapy Daily Treatment Note  Date:  5/10/2019    Patient Name:  Arnie Curry    :  1950  MRN: [id number]  Restrictions/Precautions:    Medical/Treatment Diagnosis Information:  · Diagnosis: M17.9 Degenerative joint disease, knee, right; R TKR 19  · Treatment Diagnosis: M25.561 Right knee pain; M25.661 Right knee stiffness; R26.2 Difficulty in walking  Insurance/Certification information:  PT Insurance Information: [facility] [place] [address] [facility]  Physician Information:  Referring Practitioner: NURY Lees  Plan of care signed (Y/N):     Date of Patient follow up with Physician:     G-Code (if applicable):      Date G-Code Applied:  19  PT G-Codes  Functional Assessment Tool Used: LEFS  Score: 44  Functional Limitation: Mobility: Walking and moving around  Mobility: Walking and Moving Around Current Status (): At least 40 percent but less than 60 percent impaired, limited or restricted  Mobility: Walking and Moving Around Goal Status (): At least 20 percent but less than 40 percent impaired, limited or restricted    Progress Note: [x]  Yes  []  No  Next due by: Visit #10       Latex Allergy:  [x]NO      []YES  Preferred Language for Healthcare:   [x]English       []other:    Visit # Insurance Allowable Requires auth   7 BMN    [x]no        []yes:       Pain level:  0/10     SUBJECTIVE: Pt reports that the pain is better on the side of his knee. Doing better going up stairs, still a little clunky going down stairs.    OBJECTIVE:   Observation: incision healing improved since LV  Test measurements:  Knee flexion 94 deg pre-treatment, improves to 107 after AAROM/PROM  Knee ext 2 deg after prone hang, improves to 0 after prone hang    RESTRICTIONS/PRECAUTIONS: R TKR 2019, R THR         Exercises/Interventions: Therapeutic Ex Sets/reps Notes HEP   Pt ed: still stretch 3x/day even on days in therapy 2'     Star stretch EOB 60\"x2  X   Step stretch on staircase  Heel slide with strap on SWB  On mat 5\"x10  \"   PROM last 3 X   HS stretch on step 60\"x2  X   Calf stretch standing on incline board 30\"x3  X   Prone hang 3' 3# X   Quad set- propped on 1/2 FR 10\"x10  X   SAQ 3\" 2x102#    LAQ 3\"2x102#    SLR- propped on 1/2 FR 2x102# QS each time X   Bridge     Step up fwd, lateral     Up and over x15 ea    With AT 6\" with mirror, cues to avoid knee valgus  4\"    Sit to stand, 2 airex 2x10 UE support, chair behind for target    Side step  OVL above knees          Bike - rocking  Unable to revolve    EZ stretch flex, ext  With AT     Manual Intervention      Patella mobs- all 20x ea     Post tib mob GII 15\"x4     STM vastus lateralis and IT band, VMO  Roller to IT band 5'       PROM flexion- seated 4'                 NMR re-education      Gait training s AD- focus on heel-toe gait                                        Therapeutic Exercise and NMR EXR  [x] (32592) Provided verbal/tactile cueing for activities related to strengthening, flexibility, endurance, ROM for improvements in LE, proximal hip, and core control with self care, mobility, lifting, ambulation.  [] (91496) Provided verbal/tactile cueing for activities related to improving balance, coordination, kinesthetic sense, posture, motor skill, proprioception  to assist with LE, proximal hip, and core control in self care, mobility, lifting, ambulation and eccentric single leg control.      NMR and Therapeutic Activities:    [] (72441 or 34084) Provided verbal/tactile cueing for activities related to improving balance, coordination, kinesthetic sense, posture, motor skill, proprioception and motor activation to allow for proper function of core, proximal hip and LE with self care and ADLs  [x] (13215) Gait Re-education- Provided training and instruction to the patient for proper LE, core and proximal hip recruitment and positioning and eccentric body weight control with ambulation re-education including up and down stairs     Home Exercise Program:    [x] (40047) Reviewed/Progressed HEP activities related to strengthening, flexibility, endurance, ROM of core, proximal hip and LE for functional self-care, mobility, lifting and ambulation/stair navigation   [] (27557)Reviewed/Progressed HEP activities related to improving balance, coordination, kinesthetic sense, posture, motor skill, proprioception of core, proximal hip and LE for self care, mobility, lifting, and ambulation/stair navigation      Manual Treatments:  PROM / STM / Oscillations-Mobs:  G-I, II, III, IV (PA's, Inf., Post.)  [x] (46825) Provided manual therapy to mobilize LE, proximal hip and/or LS spine soft tissue/joints for the purpose of modulating pain, promoting relaxation,  increasing ROM, reducing/eliminating soft tissue swelling/inflammation/restriction, improving soft tissue extensibility and allowing for proper ROM for normal function with self care, mobility, lifting and ambulation. Modalities:  Declined, will ice at home  Charges:  Timed Code Treatment Minutes: 40   Total Treatment Minutes: 55 ( rest breaks, indep exercise)     [] EVAL (LOW) 27428 (typically 20 minutes face-to-face)  [] EVAL (MOD) 24012 (typically 30 minutes face-to-face)  [] EVAL (HIGH) 46682 (typically 45 minutes face-to-face)  [] RE-EVAL     [x] PH(23031) x  2   [] IONTO  [] NMR (31009) x      [] VASO concurrent with ES  [x] Manual (87224) x  1    [] Other:  [] TA x       [] Mech Traction (76185)  [] ES(attended) (76924)      [] ES (un) (64385):     GOALS: Patient stated goal:Get back to normal, be able to golf, walk distances, kneel on the floor     Therapist goals for Patient:   Short Term Goals: To be achieved in: 2 weeks  1. Independent in HEP and progression per patient tolerance, in order to prevent re-injury.    2. Patient will have a decrease in pain to facilitate improvement in movement, function, and ADLs as indicated by Functional Deficits.     Long Term Goals: To be achieved in: 8 weeks  1. Disability index score of 25% or less for the LEFS to assist with reaching prior level of function. 2. Patient will demonstrate increased L knee AROM to 0-115 to allow for proper joint functioning for normal gait, reciprocal stair amb, squatting and kneeling. 3. Patient will demonstrate an increase in Strength to 5/5 for the right knee and hip musculature in order to be able to walk prolonged distances, squat, and do heavier household work. 4. Patient will return to walking at least 1 mile with a normal gait pattern and back to his part-time job without limitations. 5. Patient will have improved balance to 20\" on the RLE to prevent future falls. Progression Towards Functional goals:  [] Patient is progressing as expected towards functional goals listed. [x] Progression is slowed due to complexities listed. [] Progression has been slowed due to co-morbidities. [] Plan just implemented, too soon to assess goals progression  [] Other:     ASSESSMENT:Pt's ROM better this visit with step stretch and SASTM. He is ready to decrease to 2x/week.      Treatment/Activity Tolerance:  [] Patient tolerated treatment well [] Patient limited by fatique  [x] Patient limited by pain  [] Patient limited by other medical complications  [] Other:     Prognosis: [x] Good [] Fair  [] Poor    Patient Requires Follow-up: [x] Yes  [] No    PLAN: Cont to restore knee ROM, EZ stretch vs bike  [x] Continue per plan of care [] Alter current plan (see comments)  [] Plan of care initiated [] Hold pending MD visit [] Discharge    Electronically signed by: Eve Leach, PT, DPT

## 2019-05-13 ENCOUNTER — HOSPITAL ENCOUNTER (OUTPATIENT)
Dept: PHYSICAL THERAPY | Age: 69
Setting detail: THERAPIES SERIES
Discharge: HOME | End: 2019-05-13
Payer: COMMERCIAL

## 2019-05-13 PROCEDURE — 97140 MANUAL THERAPY 1/> REGIONS: CPT

## 2019-05-13 PROCEDURE — 97110 THERAPEUTIC EXERCISES: CPT

## 2019-05-13 NOTE — FLOWSHEET NOTE
[facility], [address] [place] [address]  Phone: [phone]  Fax [phone]    Physical Therapy Daily Treatment Note  Date:  2019    Patient Name:  Christy Alves    :  1950  MRN: [id number]  Restrictions/Precautions:    Medical/Treatment Diagnosis Information:  · Diagnosis: M17.9 Degenerative joint disease, knee, right; R TKR 19  · Treatment Diagnosis: M25.561 Right knee pain; M25.661 Right knee stiffness; R26.2 Difficulty in walking  Insurance/Certification information:  PT Insurance Information: [address] [place] [address]  [facility]  Physician Information:  Referring Practitioner: NURY Keyes  Plan of care signed (Y/N):     Date of Patient follow up with Physician:     G-Code (if applicable):      Date G-Code Applied:  19  PT G-Codes  Functional Assessment Tool Used: LEFS  Score: 44  Functional Limitation: Mobility: Walking and moving around  Mobility: Walking and Moving Around Current Status (): At least 40 percent but less than 60 percent impaired, limited or restricted  Mobility: Walking and Moving Around Goal Status (): At least 20 percent but less than 40 percent impaired, limited or restricted    Progress Note: [x]  Yes  []  No  Next due by: Visit #10       Latex Allergy:  [x]NO      []YES  Preferred Language for Healthcare:   [x]English       []other:    Visit # Insurance Allowable Requires auth   8- PN NV BMN    [x]no        []yes:       Pain level:  3/10     SUBJECTIVE: Pt reports that he is sore since Saturday. He was sitting a lot and then went to [place], didn't stretch at all that day.    OBJECTIVE:   Observation: incision nearly healed  Test measurements:  Knee flexion 100 deg pre-treatment, improves to 105 after AAROM/PROM  Knee ext 4 deg before prone hang, improves to 0 after prone hang    RESTRICTIONS/PRECAUTIONS: R TKR 2019, R THR         Exercises/Interventions:     Therapeutic Ex and motor activation to allow for proper function of core, proximal hip and LE with self care and ADLs  [x] (61942) Gait Re-education- Provided training and instruction to the patient for proper LE, core and proximal hip recruitment and positioning and eccentric body weight control with ambulation re-education including up and down stairs     Home Exercise Program:    [x] (74175) Reviewed/Progressed HEP activities related to strengthening, flexibility, endurance, ROM of core, proximal hip and LE for functional self-care, mobility, lifting and ambulation/stair navigation   [] (77447)Reviewed/Progressed HEP activities related to improving balance, coordination, kinesthetic sense, posture, motor skill, proprioception of core, proximal hip and LE for self care, mobility, lifting, and ambulation/stair navigation      Manual Treatments:  PROM / STM / Oscillations-Mobs:  G-I, II, III, IV (PA's, Inf., Post.)  [x] (05135) Provided manual therapy to mobilize LE, proximal hip and/or LS spine soft tissue/joints for the purpose of modulating pain, promoting relaxation,  increasing ROM, reducing/eliminating soft tissue swelling/inflammation/restriction, improving soft tissue extensibility and allowing for proper ROM for normal function with self care, mobility, lifting and ambulation.      Modalities:  Declined, will ice at home  Charges:  Timed Code Treatment Minutes: 40   Total Treatment Minutes: 55 ( rest breaks, indep exercise)     [] EVAL (LOW) 09882 (typically 20 minutes face-to-face)  [] EVAL (MOD) 12312 (typically 30 minutes face-to-face)  [] EVAL (HIGH) 71442 (typically 45 minutes face-to-face)  [] RE-EVAL     [x] KL(90150) x  2   [] IONTO  [] NMR (65439) x      [] VASO concurrent with ES  [x] Manual (76353) x  1    [] Other:  [] TA x       [] Mech Traction (28018)  [] ES(attended) (17405)      [] ES (un) (73043):     GOALS: Patient stated goal:Get back to normal, be able to golf, walk distances, kneel on the floor     Therapist goals for Patient:   Short Term Goals: To be achieved in: 2 weeks  1. Independent in HEP and progression per patient tolerance, in order to prevent re-injury. 2. Patient will have a decrease in pain to facilitate improvement in movement, function, and ADLs as indicated by Functional Deficits.     Long Term Goals: To be achieved in: 8 weeks  1. Disability index score of 25% or less for the LEFS to assist with reaching prior level of function. 2. Patient will demonstrate increased L knee AROM to 0-115 to allow for proper joint functioning for normal gait, reciprocal stair amb, squatting and kneeling. 3. Patient will demonstrate an increase in Strength to 5/5 for the right knee and hip musculature in order to be able to walk prolonged distances, squat, and do heavier household work. 4. Patient will return to walking at least 1 mile with a normal gait pattern and back to his part-time job without limitations. 5. Patient will have improved balance to 20\" on the RLE to prevent future falls. Progression Towards Functional goals:  [] Patient is progressing as expected towards functional goals listed. [x] Progression is slowed due to complexities listed. [] Progression has been slowed due to co-morbidities. [] Plan just implemented, too soon to assess goals progression  [] Other:     ASSESSMENT: Pt entered PT with more pain and reported feeling depressed about his progress. He did not make gains with flexion this visit, but also did not regress much. He needed encouragement to continue with HEP and PT. Reduced TE demands this visit d/t feeling more sore/stiff and pt has to work this afternoon.      Treatment/Activity Tolerance:  [] Patient tolerated treatment well [] Patient limited by fatique  [x] Patient limited by pain  [] Patient limited by other medical complications  [] Other:     Prognosis: [x] Good [] Fair  [] Poor    Patient Requires Follow-up: [x] Yes  [] No    PLAN: Cont to restore knee ROM, EZ stretch vs bike  [x] Continue per plan of care [] Alter current plan (see comments)  [] Plan of care initiated [] Hold pending MD visit [] Discharge    Electronically signed by: Minna Salinas, PT, DPT

## 2019-05-15 ENCOUNTER — HOSPITAL ENCOUNTER (OUTPATIENT)
Dept: PHYSICAL THERAPY | Age: 69
Setting detail: THERAPIES SERIES
Discharge: HOME | End: 2019-05-15
Payer: COMMERCIAL

## 2019-05-15 PROCEDURE — 97140 MANUAL THERAPY 1/> REGIONS: CPT

## 2019-05-15 PROCEDURE — 97110 THERAPEUTIC EXERCISES: CPT

## 2019-05-15 NOTE — FLOWSHEET NOTE
107 after AAROM/PROM  Knee ext 0  MMT hip flexion 5/5 B  Knee flexion 4+/5  Knee extension 5/5, but reduced ecc quad control with stair descent  Ankle MMT 5/5  SLS 10\"+    RESTRICTIONS/PRECAUTIONS: R TKR 4/9/2019, R THR 2007        Exercises/Interventions:     Therapeutic Ex Sets/reps Notes HEP   Pt ed: still stretch 3x/day even on days in therapy, probably over-did activity on Sat- walking up hills, prolonged sitting, plus pushing [employer] cart; still progressing well. He is stiff into flexion, but will continue to improve if he is diligent about stretching  R knee is warm compared to L knee; pt ed ss of infection, DVT and to seek medical atten if he has these. Warmth likely d/t increased blood flow and swelling PO, but will monitor.                 5'     Star stretch EOB 60\"x2  X   IT band stretch c strap 60\"x2  X   Step stretch on staircase  Heel slide with strap on SWB  On mat 5\"x10  \" indep      X   HS stretch on step 60\"x2 indep X   Calf stretch standing on incline board 30\"x3  X   Prone hang  3# not needed this visit X   Quad set  X   SAQ 1#    LAQ 3\"2x102#    SLR- propped on 1/2 FR 2# QS each time X   Bridge 3\"x20    Step up  lateral     Up and over x15 ea      6\" cues to avoid knee valgus  4\"    Mini squat  Sit to stand- 2 airex x10  x10 Both mildly painful X   Side step 4x40' Red TB above knees X         Bike - rocking  Unable to revolve    EZ stretch flex,  3x1' ea indep     Manual Intervention      Patella mobs- all 20x ea     Post tib mob GII      STM, vastus lateralis and IT band, VMO  Roller to IT band 8'   Pt requested no SASTM    PROM flexion- seated 3'                 NMR re-education      Gait training s AD- focus on heel-toe gait      SLS airex 10\"x 5 R,L On pillow at home X                               Therapeutic Exercise and NMR EXR  [x] (18494) Provided verbal/tactile cueing for activities related to strengthening, flexibility, endurance, ROM for improvements in LE, proximal hip, and core control with self care, mobility, lifting, ambulation.  [] (53450) Provided verbal/tactile cueing for activities related to improving balance, coordination, kinesthetic sense, posture, motor skill, proprioception  to assist with LE, proximal hip, and core control in self care, mobility, lifting, ambulation and eccentric single leg control. NMR and Therapeutic Activities:    [] (22723 or 32894) Provided verbal/tactile cueing for activities related to improving balance, coordination, kinesthetic sense, posture, motor skill, proprioception and motor activation to allow for proper function of core, proximal hip and LE with self care and ADLs  [x] (53328) Gait Re-education- Provided training and instruction to the patient for proper LE, core and proximal hip recruitment and positioning and eccentric body weight control with ambulation re-education including up and down stairs     Home Exercise Program:    [x] (19977) Reviewed/Progressed HEP activities related to strengthening, flexibility, endurance, ROM of core, proximal hip and LE for functional self-care, mobility, lifting and ambulation/stair navigation   [] (06768)Reviewed/Progressed HEP activities related to improving balance, coordination, kinesthetic sense, posture, motor skill, proprioception of core, proximal hip and LE for self care, mobility, lifting, and ambulation/stair navigation      Manual Treatments:  PROM / STM / Oscillations-Mobs:  G-I, II, III, IV (PA's, Inf., Post.)  [x] (88334) Provided manual therapy to mobilize LE, proximal hip and/or LS spine soft tissue/joints for the purpose of modulating pain, promoting relaxation,  increasing ROM, reducing/eliminating soft tissue swelling/inflammation/restriction, improving soft tissue extensibility and allowing for proper ROM for normal function with self care, mobility, lifting and ambulation.      Modalities:  Declined, will ice at home  Charges:  Timed Code Treatment Minutes: 40   Total Treatment Minutes: 55 ( rest breaks, indep exercise)     [] EVAL (LOW) 81945 (typically 20 minutes face-to-face)  [] EVAL (MOD) 28318 (typically 30 minutes face-to-face)  [] EVAL (HIGH) 62593 (typically 45 minutes face-to-face)  [] RE-EVAL     [x] AJ(07873) x  2   [] IONTO  [] NMR (42853) x      [] VASO concurrent with ES  [x] Manual (61428) x  1    [] Other:  [] TA x       [] Mech Traction (41107)  [] ES(attended) (91174)      [] ES (un) (08329):     GOALS: Patient stated goal:Get back to normal, be able to golf, walk distances, kneel on the floor     Therapist goals for Patient:   Short Term Goals: To be achieved in: 2 weeks  1. Independent in HEP and progression per patient tolerance, in order to prevent re-injury. progressing   2. Patient will have a decrease in pain to facilitate improvement in movement, function, and ADLs as indicated by Functional Deficits. progressing     Long Term Goals: To be achieved in: 8 weeks  1. Disability index score of 25% or less for the LEFS to assist with reaching prior level of function. progressubg  2. Patient will demonstrate increased L knee AROM to 0-115 to allow for proper joint functioning for normal gait, reciprocal stair amb, squatting and kneeling. progressing  3. Patient will demonstrate an increase in Strength to 5/5 for the right knee and hip musculature in order to be able to walk prolonged distances, squat, and do heavier household work. progressing  4. Patient will return to walking at least 1 mile with a normal gait pattern and back to his part-time job without limitations. progressing  5. Patient will have improved balance to 20\" on the RLE to prevent future falls. 50%     Progression Towards Functional goals:  [] Patient is progressing as expected towards functional goals listed. [x] Progression is slowed due to complexities listed. [] Progression has been slowed due to co-morbidities.   [] Plan just implemented, too soon to assess goals progression  [] Other: ASSESSMENT: Pt still struggles with knee flexion>ext ROM which alters his gait pattern with walking and with stair ambulation. Despite this, he has noted several functional improvements since beginning PT. He will benefit from continued PT 2x/week to improve R hip and knee strength, and improve knee ROM equal to CL limb.        Treatment/Activity Tolerance:  [] Patient tolerated treatment well [] Patient limited by fatique  [x] Patient limited by pain  [] Patient limited by other medical complications  [] Other:     Prognosis: [x] Good [] Fair  [] Poor    Patient Requires Follow-up: [x] Yes  [] No    PLAN: Cont PT 2x/week x 4 weeks, then 1x/week x 4 weeks if needed  [x] Continue per plan of care [] Alter current plan (see comments)  [] Plan of care initiated [] Hold pending MD visit [] Discharge    Electronically signed by: Patsy Sexton, PT, DPT

## 2019-05-15 NOTE — PLAN OF CARE
[facility], [address]  Phone: [phone]  Fax [phone]     Physical Therapy Re-Certification Plan of Care    Dear Referring Practitioner: NURY Valerio,    We had the pleasure of treating the following patient for physical therapy services at [address] [place] [address]. A summary of our findings can be found in the updated assessment below. This includes our plan of care. If you have any questions or concerns regarding these findings, please do not hesitate to contact me at the office phone number checked above. Thank you for the referral.     Physician Signature:________________________________Date:__________________  By signing above, therapists plan is approved by physician      Patient: Martine Strong   : 1950   MRN: [id number]  Referring Physician: Referring Practitioner: NURY Valerio      Evaluation Date: 5/15/2019      Medical Diagnosis Information:  · Diagnosis: M17.9 Degenerative joint disease, knee, right; R TKR 19   · Treatment Diagnosis: M25.561 Right knee pain; M25.661 Right knee stiffness; R26.2 Difficulty in walking   Insurance information: PT Insurance Information: [employer] [address] [facility]    Date Range19-19  Total visits:9      G-Codes: (if applicable)   25% disability  Functional Index used: LEFS    SUBJECTIVE: Pt reports that he has been sore on the side of his knee, it tightened up when he woke up yesterday. He is able to go up stairs normally, but still goes down stairs two feet at a time. He is improving with getting in and out of the car. Reports getting shoes on is better, but difficulty getting socks on at times.      Current Pain Scale: 0-2/10    Type: []Constant   [x]Intermitment  []Radiating []Localized  []other:     Functional Limitations: []Sitting [x]Standing [x]Walking    [x]Squatting [x]Stairs            []ADL's []Driving []Sports/Recreation  [x]Other: car mobility      · OBJECTIVE:  Knee flexion 102 deg pre-treatment, improves to 107 after AAROM/PROM  Knee ext 0  MMT hip flexion 5/5 B  Knee flexion 4+/5  Knee extension 5/5, but reduced ecc quad control with stair descent  Ankle MMT 5/5  SLS 10\"+        Gait: mild lack of TKE during stance, reduced knee flexion during swing phase; amb indep     Joint mobility:   [x]Normal    []Hypo   []Hyper    Palpation: TTP distal IT band, reduced myofascial mobility supero-lateral to patella    Orthopedic Tests: - Bradley    OTHER:      ASSESSMENT: Pt still struggles with knee flexion>ext ROM which alters his gait pattern with walking and with stair ambulation. Despite this, he has noted several functional improvements since beginning PT. He will benefit from continued PT 2x/week to improve R hip and knee strength, and improve knee ROM equal to CL limb.       Response to Treatment:   [x]Patient is responding well to treatment and improvement is noted with regards  to goals   []Patient should continue to improve in reasonable time if they continue HEP   []Patient has plateaued and is no longer responding to skilled PT intervention    []Patient is getting worse and would benefit from return to referring MD   []Patient unable to adhere to initial POC    Functional deficiencies/Impairements which affect ADL's and Reduce overall function:     [x]decreased core/proximal hip strength and neuromuscular control - Reduced  overall function   [x]decreased LE ROM/joint mobility- Reduced overall Function    [x]decreased LE strength- Reduced overall function with gait and steps   [x]difficulty with SLS- Reduced overall function and possible falls risk   [x]pain/difficulty with ambulation- reduced overall function and mobility   [x]unable to perform sport/recreational activity due to pain and dysfunction   []other:       Prognosis/Rehab Potential: []Excellent   [x]Good    []Fair   []Poor: Toleration of evaluation or treatment:    []Excellent   [x]Good    []Fair   []Poor     New or Updated Goals (if applicable):  [x] No change to goals established upon initial eval/last progress note:  New Goals:    GOALS: Patient stated goal:Get back to normal, be able to golf, walk distances, kneel on the floor     Therapist goals for Patient:   Short Term Goals: To be achieved in: 2 weeks  1. Independent in HEP and progression per patient tolerance, in order to prevent re-injury. progressing   2. Patient will have a decrease in pain to facilitate improvement in movement, function, and ADLs as indicated by Functional Deficits. progressing     Long Term Goals: To be achieved in: 8 weeks  1. Disability index score of 25% or less for the LEFS to assist with reaching prior level of function. progressubg  2. Patient will demonstrate increased L knee AROM to 0-115 to allow for proper joint functioning for normal gait, reciprocal stair amb, squatting and kneeling. progressing  3. Patient will demonstrate an increase in Strength to 5/5 for the right knee and hip musculature in order to be able to walk prolonged distances, squat, and do heavier household work. progressing  4. Patient will return to walking at least 1 mile with a normal gait pattern and back to his part-time job without limitations. progressing  5. Patient will have improved balance to 20\" on the RLE to prevent future falls. 50%     Rehab Potential:   []Excellent   [x] Good   [] Fair   [] Poor    Plan of Care:  [x] Continue Current Therapy Intervention    Frequency/Duration:  2 days per week for 2 Weeks, then 1x/week x 4 weeks as needed  HEP instruction:   1. Ther ex including: strength training, ROM, NMR and proprioception for the proximal hip, core and Lower extremity  2. Manual therapy as indicated including Dry Needling/IASTM, STM, PROM, Gr I-IV mobilizations, spinal mobilization/manipulation.    3. Modalities as needed including: thermal agents, E-stim, US, iontophoresis as indicated. 4. Patient education on joint protection, activity modification, progression of HEP.         Electronically signed by:  Obi Sutherland PT, DPT

## 2019-05-21 ENCOUNTER — HOSPITAL ENCOUNTER (OUTPATIENT)
Dept: PHYSICAL THERAPY | Age: 69
Setting detail: THERAPIES SERIES
Discharge: HOME | End: 2019-05-21
Payer: COMMERCIAL

## 2019-05-21 PROCEDURE — 97110 THERAPEUTIC EXERCISES: CPT

## 2019-05-21 PROCEDURE — 97140 MANUAL THERAPY 1/> REGIONS: CPT

## 2019-05-21 NOTE — FLOWSHEET NOTE
[facility], [address]  Phone: [phone]  Fax [phone]    Physical Therapy Daily Treatment Note  Date:  2019    Patient Name:  Mirza Hernandez    :  1950  MRN: [id number]  Restrictions/Precautions:    Medical/Treatment Diagnosis Information:  · Diagnosis: M17.9 Degenerative joint disease, knee, right; R TKR 19  · Treatment Diagnosis: M25.561 Right knee pain; M25.661 Right knee stiffness; R26.2 Difficulty in walking  Insurance/Certification information:  PT Insurance Information: [employer]  Physician Information:  Referring Practitioner: NURY Craig  Plan of care signed (Y/N):     Date of Patient follow up with Physician:     G-Code (if applicable):      Date G-Code Applied:  19  PT G-Codes  Functional Assessment Tool Used: LEFS  Score: 31  Functional Limitation: Mobility: Walking and moving around  Mobility: Walking and Moving Around Current Status (): At least 20 percent but less than 40 percent impaired, limited or restricted  Mobility: Walking and Moving Around Goal Status (): At least 20 percent but less than 40 percent impaired, limited or restricted    Progress Note: [x]  Yes  []  No  Next due by: Visit #19     Latex Allergy:  [x]NO      []YES  Preferred Language for Healthcare:   [x]English       []other:    Visit # Insurance Allowable Requires auth   10- PN written at 9 BMN    [x]no        []yes:       Pain level:  0-2/10     SUBJECTIVE: Pt states he is still very sore in his quad and ITB. He again wants to avoid the SASTM.     OBJECTIVE:   Observation: incision nearly healed  Test measurements:  Knee flexion 104 deg pre-treatment, improves to 114 after AAROM/PROM  Knee ext 0      RESTRICTIONS/PRECAUTIONS: R TKR 2019, R THR         Exercises/Interventions:     Therapeutic Ex Sets/reps Notes HEP   Pt ed: still stretch 3x/day even on days in therapy, probably over-did activity on Sat- walking up hills, prolonged sitting, plus pushing [employer] cart; still progressing well. He is stiff into flexion, but will continue to improve if he is diligent about stretching  R knee is warm compared to L knee; pt ed ss of infection, DVT and to seek medical atten if he has these. Warmth likely d/t increased blood flow and swelling PO, but will monitor.                      Star stretch EOB 60\"x2  X   IT band stretch c strap 60\"x2  X   Step stretch on staircase  Heel slide with strap on SWB  On mat 5\"x10  \" indep      X   HS stretch on step 60\"x2 indep X   Calf stretch standing on incline board 30\"x3  X   Prone hang  3# not needed this visit X   Quad set  X   SAQ with VMO squeeze 3\" 2x10 2#    LAQ with VMO squeeze 3\"2x10 2#    SLR- propped on 1/2 FR 2# QS each time X   Bridge  SL bridge with CL LE march 3\"x10  3\" x10    Step up  lateral     Up and over x15 ea      6\" cues to avoid knee valgus  4\"    Mini squat  Sit to stand- from hi-low table  With RLE slightly back x10  x10  x5 Both mildly painful X   Side step 4x40' Red TB above knees X         Bike - rocking  Unable to revolve    EZ stretch flex,  3x1' ea indep     Manual Intervention      Patella mobs- all 20x ea     Post tib mob GII      STM, vastus lateralis and IT band, VMO  Roller to IT band  Manual ITB S 10'   Pt requested no SASTM    PROM flexion- seated 3'                 NMR re-education      Gait training s AD- focus on heel-toe gait      SLS airex 10\"x 5 R,L On pillow at home X   Tandem balance on Airex 30\"  X2 R/L                           Therapeutic Exercise and NMR EXR  [x] (40730) Provided verbal/tactile cueing for activities related to strengthening, flexibility, endurance, ROM for improvements in LE, proximal hip, and core control with self care, mobility, lifting, ambulation.  [] (95488) Provided verbal/tactile cueing for activities related to improving balance, coordination, kinesthetic sense, posture, motor skill, proprioception  to assist with LE, proximal hip, and core control in self care, mobility, lifting, ambulation and eccentric single leg control. NMR and Therapeutic Activities:    [] (72267 or 86496) Provided verbal/tactile cueing for activities related to improving balance, coordination, kinesthetic sense, posture, motor skill, proprioception and motor activation to allow for proper function of core, proximal hip and LE with self care and ADLs  [x] (40511) Gait Re-education- Provided training and instruction to the patient for proper LE, core and proximal hip recruitment and positioning and eccentric body weight control with ambulation re-education including up and down stairs     Home Exercise Program:    [x] (80730) Reviewed/Progressed HEP activities related to strengthening, flexibility, endurance, ROM of core, proximal hip and LE for functional self-care, mobility, lifting and ambulation/stair navigation   [] (11073)Reviewed/Progressed HEP activities related to improving balance, coordination, kinesthetic sense, posture, motor skill, proprioception of core, proximal hip and LE for self care, mobility, lifting, and ambulation/stair navigation      Manual Treatments:  PROM / STM / Oscillations-Mobs:  G-I, II, III, IV (PA's, Inf., Post.)  [x] (15580) Provided manual therapy to mobilize LE, proximal hip and/or LS spine soft tissue/joints for the purpose of modulating pain, promoting relaxation,  increasing ROM, reducing/eliminating soft tissue swelling/inflammation/restriction, improving soft tissue extensibility and allowing for proper ROM for normal function with self care, mobility, lifting and ambulation.      Modalities:  Declined, will ice at home  Charges:  Timed Code Treatment Minutes: 55   Total Treatment Minutes: 65 ( rest breaks, indep exercise)     [] EVAL (LOW) 67957 (typically 20 minutes face-to-face)  [] EVAL (MOD) 79805 (typically 30 minutes face-to-face)  [] EVAL (HIGH) 72968 (typically 45 minutes face-to-face)  [] RE-EVAL     [x] BP(65463) x  3   [] IONTO  [] NMR (14044) x      [] VASO concurrent with ES  [x] Manual (63745) x  1    [] Other:  [] TA x       [] Mech Traction (81635)  [] ES(attended) (56307)      [] ES (un) (58336):     GOALS: Patient stated goal:Get back to normal, be able to golf, walk distances, kneel on the floor     Therapist goals for Patient:   Short Term Goals: To be achieved in: 2 weeks  1. Independent in HEP and progression per patient tolerance, in order to prevent re-injury. progressing   2. Patient will have a decrease in pain to facilitate improvement in movement, function, and ADLs as indicated by Functional Deficits. progressing     Long Term Goals: To be achieved in: 8 weeks  1. Disability index score of 25% or less for the LEFS to assist with reaching prior level of function. progressubg  2. Patient will demonstrate increased L knee AROM to 0-115 to allow for proper joint functioning for normal gait, reciprocal stair amb, squatting and kneeling. progressing  3. Patient will demonstrate an increase in Strength to 5/5 for the right knee and hip musculature in order to be able to walk prolonged distances, squat, and do heavier household work. progressing  4. Patient will return to walking at least 1 mile with a normal gait pattern and back to his part-time job without limitations. progressing  5. Patient will have improved balance to 20\" on the RLE to prevent future falls. 50%     Progression Towards Functional goals:  [] Patient is progressing as expected towards functional goals listed. [x] Progression is slowed due to complexities listed. [] Progression has been slowed due to co-morbidities. [] Plan just implemented, too soon to assess goals progression  [] Other:     ASSESSMENT: Pt still struggles with knee flexion>ext ROM which alters his gait pattern with walking and with stair ambulation.  Inc'd proximal strength will aide valgus control with SLS,

## 2019-05-23 ENCOUNTER — APPOINTMENT (OUTPATIENT)
Dept: PHYSICAL THERAPY | Age: 69
End: 2019-05-23
Payer: COMMERCIAL

## 2019-05-24 ENCOUNTER — HOSPITAL ENCOUNTER (OUTPATIENT)
Dept: PHYSICAL THERAPY | Age: 69
Setting detail: THERAPIES SERIES
Discharge: HOME | End: 2019-05-24
Payer: COMMERCIAL

## 2019-05-24 PROCEDURE — 97140 MANUAL THERAPY 1/> REGIONS: CPT

## 2019-05-24 PROCEDURE — 97110 THERAPEUTIC EXERCISES: CPT

## 2019-05-24 NOTE — FLOWSHEET NOTE
related to improving balance, coordination, kinesthetic sense, posture, motor skill, proprioception  to assist with LE, proximal hip, and core control in self care, mobility, lifting, ambulation and eccentric single leg control. NMR and Therapeutic Activities:    [] (67101 or 25310) Provided verbal/tactile cueing for activities related to improving balance, coordination, kinesthetic sense, posture, motor skill, proprioception and motor activation to allow for proper function of core, proximal hip and LE with self care and ADLs  [x] (09769) Gait Re-education- Provided training and instruction to the patient for proper LE, core and proximal hip recruitment and positioning and eccentric body weight control with ambulation re-education including up and down stairs     Home Exercise Program:    [x] (85502) Reviewed/Progressed HEP activities related to strengthening, flexibility, endurance, ROM of core, proximal hip and LE for functional self-care, mobility, lifting and ambulation/stair navigation   [] (42062)Reviewed/Progressed HEP activities related to improving balance, coordination, kinesthetic sense, posture, motor skill, proprioception of core, proximal hip and LE for self care, mobility, lifting, and ambulation/stair navigation      Manual Treatments:  PROM / STM / Oscillations-Mobs:  G-I, II, III, IV (PA's, Inf., Post.)  [x] (60496) Provided manual therapy to mobilize LE, proximal hip and/or LS spine soft tissue/joints for the purpose of modulating pain, promoting relaxation,  increasing ROM, reducing/eliminating soft tissue swelling/inflammation/restriction, improving soft tissue extensibility and allowing for proper ROM for normal function with self care, mobility, lifting and ambulation.      Modalities:  Declined, will ice at home    Charges:  Timed Code Treatment Minutes: 50   Total Treatment Minutes: 65 (indep exercise, bike)     [] EVAL (LOW) 57132 (typically 20 minutes face-to-face)  [] EVAL (MOD) 62286 (typically 30 minutes face-to-face)  [] EVAL (HIGH) 92372 (typically 45 minutes face-to-face)  [] RE-EVAL     [x] XN(50588) x  2   [] IONTO  [] NMR (05419) x      [] VASO concurrent with ES  [x] Manual (63316) x  1    [] Other:  [] TA x       [] Mech Traction (90005)  [] ES(attended) (54487)      [] ES (un) (59965):     GOALS: Patient stated goal:Get back to normal, be able to golf, walk distances, kneel on the floor     Therapist goals for Patient:   Short Term Goals: To be achieved in: 2 weeks  1. Independent in HEP and progression per patient tolerance, in order to prevent re-injury. progressing   2. Patient will have a decrease in pain to facilitate improvement in movement, function, and ADLs as indicated by Functional Deficits. progressing     Long Term Goals: To be achieved in: 8 weeks  1. Disability index score of 25% or less for the LEFS to assist with reaching prior level of function. progressubg  2. Patient will demonstrate increased L knee AROM to 0-115 to allow for proper joint functioning for normal gait, reciprocal stair amb, squatting and kneeling. progressing  3. Patient will demonstrate an increase in Strength to 5/5 for the right knee and hip musculature in order to be able to walk prolonged distances, squat, and do heavier household work. progressing  4. Patient will return to walking at least 1 mile with a normal gait pattern and back to his part-time job without limitations. progressing  5. Patient will have improved balance to 20\" on the RLE to prevent future falls. 50%     Progression Towards Functional goals:  [] Patient is progressing as expected towards functional goals listed. [x] Progression is slowed due to complexities listed. [] Progression has been slowed due to co-morbidities. [] Plan just implemented, too soon to assess goals progression  [] Other:     ASSESSMENT: Pt still struggles with knee flexion>ext ROM.  He was able to revolve around the bike a few times, but continues to have c/o tightness in quad and anterior knee along joint line. Treatment/Activity Tolerance:  [] Patient tolerated treatment well [] Patient limited by fatique  [x] Patient limited by pain  [] Patient limited by other medical complications  [] Other:     Prognosis: [x] Good [] Fair  [] Poor    Patient Requires Follow-up: [x] Yes  [] No    PLAN: Flexion ROM.  Cont PT 2x/week x 4 weeks, then 1x/week x 4 weeks if needed  [x] Continue per plan of care [] Alter current plan (see comments)  [] Plan of care initiated [] Hold pending MD visit [] Discharge    Electronically signed by: Lexie Miller, PT, DPT

## 2019-05-28 ENCOUNTER — HOSPITAL ENCOUNTER (OUTPATIENT)
Dept: PHYSICAL THERAPY | Age: 69
Setting detail: THERAPIES SERIES
Discharge: HOME | End: 2019-05-28
Payer: COMMERCIAL

## 2019-05-28 PROCEDURE — 97110 THERAPEUTIC EXERCISES: CPT

## 2019-05-28 PROCEDURE — 97140 MANUAL THERAPY 1/> REGIONS: CPT

## 2019-05-28 NOTE — FLOWSHEET NOTE
[facility], [address]  Phone: [phone]  Fax [phone]    Physical Therapy Daily Treatment Note  Date:  2019    Patient Name:  Mirza Hernandez    :  1950  MRN: [id number]  Restrictions/Precautions:    Medical/Treatment Diagnosis Information:  · Diagnosis: M17.9 Degenerative joint disease, knee, right; R TKR 19  · Treatment Diagnosis: M25.561 Right knee pain; M25.661 Right knee stiffness; R26.2 Difficulty in walking  Insurance/Certification information:  PT Insurance Information: Rollene Saint  W Jose De Jesus Ortez  Physician Information:  Referring Practitioner: NURY Craig  Plan of care signed (Y/N):     Date of Patient follow up with Physician:     G-Code (if applicable):      Date G-Code Applied:  19  PT G-Codes  Functional Assessment Tool Used: LEFS  Score: 31  Functional Limitation: Mobility: Walking and moving around  Mobility: Walking and Moving Around Current Status (): At least 20 percent but less than 40 percent impaired, limited or restricted  Mobility: Walking and Moving Around Goal Status (): At least 20 percent but less than 40 percent impaired, limited or restricted    Progress Note: [x]  Yes  []  No  Next due by: Visit #19     Latex Allergy:  [x]NO      []YES  Preferred Language for Healthcare:   [x]English       []other:    Visit # Insurance Allowable Requires auth   12 BMN    [x]no        []yes:       Pain level:  0-2/10     SUBJECTIVE: Pt was not sore after LV, just felt discomfort while on the bike.      OBJECTIVE:   Observation: incision healed, gr II edema R calf, quad, - Bradley's  Test measurements:  Knee flexion 104 deg pre-treatment, improves to 112.5 after AAROM/PROM  Knee ext 0      RESTRICTIONS/PRECAUTIONS: R TKR 2019, R THR         Exercises/Interventions:     Therapeutic Ex Sets/reps Notes HEP   Pt ed: still stretch 3x/day even on days in therapy, probably over-did activity on Sat- walking up hills, prolonged sitting, plus pushing [employer] cart; still progressing well. He is stiff into flexion, but will continue to improve if he is diligent about stretching  R knee is warm compared to L knee; pt ed ss of infection, DVT and to seek medical atten if he has these. Warmth likely d/t increased blood flow and swelling PO, but will monitor.                      Star stretch EOB 60\"x2  X   IT band stretch c strap 60\"x2  X   Step stretch on staircase  Heel slide with strap on SWB  On mat 5\"x10  \" indep      X   HS stretch on step 60\"x2 indep X   Calf stretch standing on incline board 30\"x3  X   Prone quad S with strap 30\"x4     Prone hang  3# not needed this visit X   Quad set  X   SAQ  3\" 2x10 5#    LAQ with VMO squeeze 3\"2x10 4#    SLR-  2x102# QS each time X   SL hip abd 2x102# A for not rolling    Bridge  SL bridge with CL LE kick 3\"x10  3\" [id number] hips down into more knee flex     Step up fwd to Danie [place] 8\"    LSD 4\"  Up and over x10 ea    x15   x10   cues to avoid knee valgus    UE support  6\"    Mini squat  Sit to stand- from hi-low table  With RLE slightly back x10  x10  x5 L knee sore today X   Side step 4x40' GVL at ankles X   NV RACHEL, LP, HS curl, LAQ       Bike  5' Able to revolve    EZ stretch flex,   indep     Manual Intervention      Patella mobs- all 20x ea     Post tib mob GII      STM to quad and IT band  Roller to IT band  Manual ITB S 10'   Pt requested no SASTM    PROM flexion- seated, supine and prone 10\"x5 ea                 NMR re-education      Gait training s AD- focus on heel-toe gait      SLS airex 10\"x 10 R,L On pillow at home X   Tandem balance on Airex EC 30\"  X2 R/L                           Therapeutic Exercise and NMR EXR  [x] (99410) Provided verbal/tactile cueing for activities related to strengthening, flexibility, endurance, ROM for improvements in LE, proximal hip, and core control with self care, mobility, lifting, ambulation.  [] (42303) Provided verbal/tactile cueing for activities related to improving balance, coordination, kinesthetic sense, posture, motor skill, proprioception  to assist with LE, proximal hip, and core control in self care, mobility, lifting, ambulation and eccentric single leg control. NMR and Therapeutic Activities:    [] (31877 or 20159) Provided verbal/tactile cueing for activities related to improving balance, coordination, kinesthetic sense, posture, motor skill, proprioception and motor activation to allow for proper function of core, proximal hip and LE with self care and ADLs  [x] (20534) Gait Re-education- Provided training and instruction to the patient for proper LE, core and proximal hip recruitment and positioning and eccentric body weight control with ambulation re-education including up and down stairs     Home Exercise Program:    [x] (40127) Reviewed/Progressed HEP activities related to strengthening, flexibility, endurance, ROM of core, proximal hip and LE for functional self-care, mobility, lifting and ambulation/stair navigation   [] (84079)Reviewed/Progressed HEP activities related to improving balance, coordination, kinesthetic sense, posture, motor skill, proprioception of core, proximal hip and LE for self care, mobility, lifting, and ambulation/stair navigation      Manual Treatments:  PROM / STM / Oscillations-Mobs:  G-I, II, III, IV (PA's, Inf., Post.)  [x] (61926) Provided manual therapy to mobilize LE, proximal hip and/or LS spine soft tissue/joints for the purpose of modulating pain, promoting relaxation,  increasing ROM, reducing/eliminating soft tissue swelling/inflammation/restriction, improving soft tissue extensibility and allowing for proper ROM for normal function with self care, mobility, lifting and ambulation.      Modalities:  Declined, will ice at home    Charges:  Timed Code Treatment Minutes: 50   Total Treatment Minutes: 64 (indep exercise, bike)     [] EVAL (LOW) 455 [id number] (typically 20 minutes face-to-face)  [] EVAL (MOD) 50546 (typically 30 minutes face-to-face)  [] EVAL (HIGH) 24421 (typically 45 minutes face-to-face)  [] RE-EVAL     [x] GK(97265) x  2   [] IONTO  [] NMR (99711) x      [] VASO concurrent with ES  [x] Manual (46693) x  1    [] Other:  [] TA x       [] Mech Traction (77461)  [] ES(attended) (97557)      [] ES (un) (20613):     GOALS: Patient stated goal:Get back to normal, be able to golf, walk distances, kneel on the floor     Therapist goals for Patient:   Short Term Goals: To be achieved in: 2 weeks  1. Independent in HEP and progression per patient tolerance, in order to prevent re-injury. progressing   2. Patient will have a decrease in pain to facilitate improvement in movement, function, and ADLs as indicated by Functional Deficits. progressing     Long Term Goals: To be achieved in: 8 weeks  1. Disability index score of 25% or less for the LEFS to assist with reaching prior level of function. progressubg  2. Patient will demonstrate increased L knee AROM to 0-115 to allow for proper joint functioning for normal gait, reciprocal stair amb, squatting and kneeling. progressing  3. Patient will demonstrate an increase in Strength to 5/5 for the right knee and hip musculature in order to be able to walk prolonged distances, squat, and do heavier household work. progressing  4. Patient will return to walking at least 1 mile with a normal gait pattern and back to his part-time job without limitations. progressing  5. Patient will have improved balance to 20\" on the RLE to prevent future falls. 50%     Progression Towards Functional goals:  [] Patient is progressing as expected towards functional goals listed. [x] Progression is slowed due to complexities listed. [] Progression has been slowed due to co-morbidities.   [] Plan just implemented, too soon to assess goals progression  [] Other:     ASSESSMENT: Pt is making progress as he's been able to start with inc'd flexion AROM and can now revolve around the bike. D/t edema and mm guarding, still struggles with flexion >112 deg. Treatment/Activity Tolerance:  [] Patient tolerated treatment well [] Patient limited by fatique  [x] Patient limited by pain  [] Patient limited by other medical complications  [] Other:     Prognosis: [x] Good [] Fair  [] Poor    Patient Requires Follow-up: [x] Yes  [] No    PLAN: Flexion ROM.  Cont PT 2x/week x 4 weeks, then 1x/week x 4 weeks if needed  [x] Continue per plan of care [] Alter current plan (see comments)  [] Plan of care initiated [] Hold pending MD visit [] Discharge    Electronically signed by: Elana Sorensen, PT, DPT

## 2019-05-30 ENCOUNTER — HOSPITAL ENCOUNTER (OUTPATIENT)
Dept: PHYSICAL THERAPY | Age: 69
Setting detail: THERAPIES SERIES
Discharge: HOME | End: 2019-05-30
Payer: COMMERCIAL

## 2019-05-30 ENCOUNTER — APPOINTMENT (OUTPATIENT)
Dept: PHYSICAL THERAPY | Age: 69
End: 2019-05-30
Payer: COMMERCIAL

## 2019-05-30 PROCEDURE — 97110 THERAPEUTIC EXERCISES: CPT

## 2019-05-30 PROCEDURE — 97140 MANUAL THERAPY 1/> REGIONS: CPT

## 2019-05-30 NOTE — FLOWSHEET NOTE
[facility], [address]  Phone: [phone]  Fax [phone]    Physical Therapy Daily Treatment Note  Date:  2019    Patient Name:  Lola Bhatia    :  1950  MRN: [id number]  Restrictions/Precautions:    Medical/Treatment Diagnosis Information:  · Diagnosis: M17.9 Degenerative joint disease, knee, right; R TKR 19  · Treatment Diagnosis: M25.561 Right knee pain; M25.661 Right knee stiffness; R26.2 Difficulty in walking  Insurance/Certification information:  PT Insurance Information: [facility]  [facility]  Physician Information:  Referring Practitioner: NURY Dia  Plan of care signed (Y/N):     Date of Patient follow up with Physician:     G-Code (if applicable):      Date G-Code Applied:  19  PT G-Codes  Functional Assessment Tool Used: LEFS  Score: 31  Functional Limitation: Mobility: Walking and moving around  Mobility: Walking and Moving Around Current Status (): At least 20 percent but less than 40 percent impaired, limited or restricted  Mobility: Walking and Moving Around Goal Status (): At least 20 percent but less than 40 percent impaired, limited or restricted    Progress Note: [x]  Yes  []  No  Next due by: Visit #19     Latex Allergy:  [x]NO      []YES  Preferred Language for Healthcare:   [x]English       []other:    Visit # Insurance Allowable Requires auth   13 BMN    [x]no        []yes:       Pain level:  0-2/10     SUBJECTIVE: Pt feels good today, had some pain in the joint after LV but isn't hurting much now in his quad and ITB.     OBJECTIVE:   Observation: incision healed, good resciprocal amb pattern  Test measurements:  Knee flexion 1-112 deg pre-treatment, AAROM/PROM 0-113.5        RESTRICTIONS/PRECAUTIONS: R TKR 2019, R THR         Exercises/Interventions:     Therapeutic Ex Sets/reps Notes HEP   Pt ed: still stretch 3x/day even on days in therapy, probably over-did activity on Sat- walking up hills, prolonged sitting, plus pushing [employer] cart; still progressing well. He is stiff into flexion, but will continue to improve if he is diligent about stretching  R knee is warm compared to L knee; pt ed ss of infection, DVT and to seek medical atten if he has these. Warmth likely d/t increased blood flow and swelling PO, but will monitor.                      Star stretch EOB 60\"x2  X   IT band stretch c strap 60\"x2 Manual today X   Step stretch on staircase  Heel slide with strap on SWB  On mat 5\"x10  \" indep      X   HS stretch on step 60\"x2 indep X   Calf stretch standing on incline board 30\"x3  X   Prone quad S with strap 30\"x4     Prone hang  3# not needed this visit X   Quad set  X   SAQ  3\" 2x10 5#    LAQ with VMO squeeze 3\"2x10 4#    SLR-  2x102# QS each time X   SL hip abd 2x102# A for not rolling    Bridge  SL bridge with CL LE kick 3\"x10  3\" [id number] hips down into more knee flex     Step up fwd to [place] [place] 8\"    LSD 6\"  Up and over x10 ea    x15   x10   cues to avoid knee valgus    UE support  6\"    Mini squat  Sit to stand- chair + Airex  With RLE slightly back x10  x10  x10  X   Side step 4x40' GVL at ankles X    RACHEL, LP, HS curl, LAQ  See AT note Include health program started    Bike  5' I exc; Able to revolve    EZ stretch flex,   indep     Manual Intervention      Patella mobs- all 20x ea     Post tib mob GII      STM to quad and IT band  Roller to IT band  Manual ITB S 10'   Pt requested no SASTM    PROM flexion- seated, supine and  10\"x5 ea                 NMR re-education      Gait training s AD- focus on heel-toe gait      SLS airex 10\"x 10 R,L On pillow at home X   Tandem balance on Airex EC      Resisted gait 4 way in CC X10/x10  x5 R/L ea 50# retro, 40# forward  30# lat                    Therapeutic Exercise and NMR EXR  [x] (35721) Provided verbal/tactile cueing for activities related to strengthening, flexibility, endurance, ROM for Treatment Minutes: 45   Total Treatment Minutes: 60 (indep exercise, bike)     [] EVAL (LOW) 28530 (typically 20 minutes face-to-face)  [] EVAL (MOD) 18473 (typically 30 minutes face-to-face)  [] EVAL (HIGH) 57546 (typically 45 minutes face-to-face)  [] RE-EVAL     [x] HS(08794) x  2   [] IONTO  [] NMR (75568) x      [] VASO concurrent with ES  [x] Manual (60123) x  1    [] Other:  [] TA x       [] Mech Traction (77567)  [] ES(attended) (08694)      [] ES (un) (43144):     GOALS: Patient stated goal:Get back to normal, be able to golf, walk distances, kneel on the floor     Therapist goals for Patient:   Short Term Goals: To be achieved in: 2 weeks  1. Independent in HEP and progression per patient tolerance, in order to prevent re-injury. progressing   2. Patient will have a decrease in pain to facilitate improvement in movement, function, and ADLs as indicated by Functional Deficits. progressing     Long Term Goals: To be achieved in: 8 weeks  1. Disability index score of 25% or less for the LEFS to assist with reaching prior level of function. progressing  2. Patient will demonstrate increased L knee AROM to 0-115 to allow for proper joint functioning for normal gait, reciprocal stair amb, squatting and kneeling. progressing  3. Patient will demonstrate an increase in Strength to 5/5 for the right knee and hip musculature in order to be able to walk prolonged distances, squat, and do heavier household work. progressing  4. Patient will return to walking at least 1 mile with a normal gait pattern and back to his part-time job without limitations. progressing  5. Patient will have improved balance to 20\" on the RLE to prevent future falls. 50%     Progression Towards Functional goals:  [x] Patient is progressing as expected towards functional goals listed. [] Progression is slowed due to complexities listed. [] Progression has been slowed due to co-morbidities.   [] Plan just implemented, too soon to assess goals progression  [] Other:     ASSESSMENT: Pt's AROM flexion is improving with less manual tx. His gait is reciprocal with improved quad control. Treatment/Activity Tolerance:  [] Patient tolerated treatment well [] Patient limited by fatique  [x] Patient limited by pain  [] Patient limited by other medical complications  [] Other:     Prognosis: [x] Good [] Fair  [] Poor    Patient Requires Follow-up: [x] Yes  [] No    PLAN: Flexion ROM.  Cont PT 2x/week x 4 weeks, then 1x/week x 4 weeks if needed  [x] Continue per plan of care [] Alter current plan (see comments)  [] Plan of care initiated [] Hold pending MD visit [] Discharge    Electronically signed by: Jamari Cooper, PT, DPT

## 2019-05-30 NOTE — FLOWSHEET NOTE
[facility],  [address] [place]  Phone: [phone]  Fax [phone]      ATHLETIC TRAINING [address]  Date:  2019    Patient Name:  Jordyn Donahue  \"Sandro\" :  1950  MRN: [id number]  Restrictions/Precautions:    Medical/Treatment Diagnosis Information:  ·  R knee DJD s/p R TKR  DOS 19  ·  R knee pain, stiffness, difficulty walking  Physician Information:   NURY Fine    Weeks Post-op  8 wks  12 wks 16 wks 20 wks   24 wks                            Activity Log                                                  DOS/DOI:                                                    Date: 19   ATC communication Post EZS ROM 0-2-103    Bike  5' at end   Elliptical     Treadmill     Airdyne          Gastroc stretch     Soleus stretch     Hamstring stretch     ITB stretch     Hip Flexor stretch     Quad stretch     Adductor stretch     EZS flex/ext 3x1' ea         Weight Shifting sp                               fp                               tp     Lateral walking (with/w/o TB)          Balance: PEP/Katlin board                    SLS           Star excursion load/explode           Extremity reach UE/LE          Leg Press Constantino. IH 80# 3x10 # ball add 3x10                     Ecc.  IH 80# ball add 2x10                     Inv. Calf Press Constantino. IH 80# 20x # 30x                      Ecc.                         Inv.          RACHEL   Flex                ABd 45# R/L 2x10 45# R/L 3x10              ADd               TKE 45# 20x5\" 60# 20x5\"              Ext  45# R/L 3x10        Steps Up                Up and Over                Down                Lateral                Rotation          Squats  mini                   wall                  BOSU           Lunges:  Lunge to Balance                    Balance to Lunge                    Walking          Knee Extension Bilat. IH 30# 3x10 north                              Ecc.                                Inv. Hamstring Curls Bilat. 30# 2x10 south IH 50# 3x10 north                              Ecc.                                Inv.          Soleus Press Bilat. Ecc.                            Inv.                                Ladders                 Square                Jump/Hop  Low                       Med.                       High                                                               Modality declined-home declined-home   Initials                             JLW JLW   Time spent one on one (workers comp)     Time spent with PT assistant

## 2019-06-03 ENCOUNTER — APPOINTMENT (OUTPATIENT)
Dept: PHYSICAL THERAPY | Age: 69
End: 2019-06-03
Payer: COMMERCIAL

## 2019-06-05 ENCOUNTER — APPOINTMENT (OUTPATIENT)
Dept: PHYSICAL THERAPY | Age: 69
End: 2019-06-05
Payer: COMMERCIAL

## 2019-06-05 ENCOUNTER — HOSPITAL ENCOUNTER (OUTPATIENT)
Dept: PHYSICAL THERAPY | Age: 69
Setting detail: THERAPIES SERIES
Discharge: HOME | End: 2019-06-05
Payer: COMMERCIAL

## 2019-06-05 PROCEDURE — 97110 THERAPEUTIC EXERCISES: CPT

## 2019-06-05 PROCEDURE — 97140 MANUAL THERAPY 1/> REGIONS: CPT

## 2019-06-05 NOTE — FLOWSHEET NOTE
[facility],  [address] [place]  Phone: [phone]  Fax [phone]      ATHLETIC TRAINING [address]  Date:  2019    Patient Name:  Dayna Husbands  \"Sandro\" :  1950  MRN: [id number]  Restrictions/Precautions:    Medical/Treatment Diagnosis Information:  ·  R knee DJD s/p R TKR  DOS 19  ·  R knee pain, stiffness, difficulty walking  Physician Information:   NURY Jones    Weeks Post-op  8 wks  12 wks 16 wks 20 wks   24 wks                            Activity Log                                                  DOS/DOI:                                                    Date: 19   ATC communication Post EZS ROM 0-2-103  Gave gym HEP today   Bike  5' at end    Elliptical      Treadmill      Airdyne            Gastroc stretch      Soleus stretch      Hamstring stretch      ITB stretch      Hip Flexor stretch      Quad stretch      Adductor stretch      EZS flex/ext 3x1' ea           Weight Shifting sp                                fp                                tp      Lateral walking (with/w/o TB)            Balance: PEP/Katlin board                     SLS            Star excursion load/explode            Extremity reach UE/LE            Leg Press Constantino. IH 80# 3x10 # ball add 3x10 # 3x12                     Ecc.  IH 80# ball add 2x10 IH 80# 3x10                     Inv. Calf Press Constantino.  IH 80# 20x # 30x # 30x                      Ecc.                          Inv.            RACHEL   Flex                 ABd 45# R/L 2x10 45# R/L 3x10 45# R/L 3x10              ADd                TKE 45# 20x5\" 60# 20x5\" 60# 25x5\"              Ext  45# R/L 3x10 45# R/L 3x10         Steps Up   FSU to stork on 6\" w/Airex 2x10              Up and Over   F 6\" 2x10              Down                 Lateral   LSD 4\" 2x10              Rotation            Squats

## 2019-06-05 NOTE — FLOWSHEET NOTE
[facility], [address] [place] [address]  Phone: [phone]  Fax [phone]    Physical Therapy Daily Treatment Note  Date:  2019    Patient Name:  Lola Bhatia    :  1950  MRN: [id number]  Restrictions/Precautions:    Medical/Treatment Diagnosis Information:  · Diagnosis: M17.9 Degenerative joint disease, knee, right; R TKR 19  · Treatment Diagnosis: M25.561 Right knee pain; M25.661 Right knee stiffness; R26.2 Difficulty in walking  Insurance/Certification information:  PT Insurance Information: [facility]  Physician Information:  Referring Practitioner: NURY Dia  Plan of care signed (Y/N):     Date of Patient follow up with Physician:     G-Code (if applicable):      Date G-Code Applied:  19  PT G-Codes  Functional Assessment Tool Used: LEFS  Score: 31  Functional Limitation: Mobility: Walking and moving around  Mobility: Walking and Moving Around Current Status (): At least 20 percent but less than 40 percent impaired, limited or restricted  Mobility: Walking and Moving Around Goal Status ():  At least 20 percent but less than 40 percent impaired, limited or restricted    Progress Note: [x]  Yes  []  No  Next due by: Visit #19     Latex Allergy:  [x]NO      []YES  Preferred Language for Healthcare:   [x]English       []other:    Visit # Insurance Allowable Requires auth   14- PN written BMN    [x]no        []yes:       Pain level:  0/10     SUBJECTIVE: see PN    OBJECTIVE:   Observation: incision healed, good resciprocal amb pattern  Test measurements:  Knee flexion 0-112 deg pre-treatment, 0-116 after treatment    MMT hip flexion R 5/5 B  Knee flexion R 5/5  Knee extension R 5/5, but reduced ecc quad control with stair descent  Hip abduction L 4+/5, R 4/5  Hip ER R 4/5  Ankle MMT 5/5  SLS 18\"      RESTRICTIONS/PRECAUTIONS: R TKR 2019, R THR         Exercises/Interventions: Therapeutic Ex Sets/reps Notes HEP   Pt ed: still stretch 3x/day even on days in therapy, probably over-did activity on Sat- walking up hills, prolonged sitting, plus pushing [employer] cart; still progressing well.  He is stiff into flexion, but will continue to improve if he is diligent about stretching  POC and remaining deficits               5'     Star stretch EOB 60\"x2  X   IT band stretch c strap 60\"x2 Manual today X   Step stretch on staircase  Heel slide with strap on SWB  On mat 5\"x10  \" indep      X   HS stretch on step 60\"x2 indep X   Calf stretch standing on incline board 30\"x3 indep X   Prone quad S with strap 30\"x4     Prone hang  3# not needed this visit X   Quad set  X   SAQ  5#    LAQ with VMO squeeze 4#    SLR-  2# QS each time X   SL hip abd 2x102# A for not rolling    clamshell 20xblue X   Bridge  SL bridge with CL LE kick 3\"x10  3\" [id number] hips down into more knee flex     Step up fwd to Danie [place] 8\"    LSD 6\"  Up and over x10 ea    x15   x10   cues to avoid knee valgus    UE support  6\"    Mini squat  Sit to stand- chair + Airex  With RLE slightly back NV X   Side step 4x40' GVL at ankles X    RACHEL, LP, HS curl, LAQ  See AT note Include health program started    Bike  5' I exc; Able to revolve    EZ stretch flex,   indep     Manual Intervention      Patella mobs- all 20x ea     Post tib mob GII      STM to quad and IT band  Roller to IT band  Manual ITB S 10'   Pt requested no SASTM    PROM flexion- seated,  and  10\"x5 ea                 NMR re-education      Gait training s AD- focus on heel-toe gait      SLS airex On pillow at home X   Tandem balance on Airex EC      Resisted gait 4 way in CC X10/x10  x5 R/L ea 50# retro, 40# forward  30# lat                    Therapeutic Exercise and NMR EXR  [x] (57039) Provided verbal/tactile cueing for activities related to strengthening, flexibility, endurance, ROM for improvements in LE, proximal hip, and core control with self care, mobility, lifting, ambulation.  [] (27465) Provided verbal/tactile cueing for activities related to improving balance, coordination, kinesthetic sense, posture, motor skill, proprioception  to assist with LE, proximal hip, and core control in self care, mobility, lifting, ambulation and eccentric single leg control. NMR and Therapeutic Activities:    [] (18564 or 46062) Provided verbal/tactile cueing for activities related to improving balance, coordination, kinesthetic sense, posture, motor skill, proprioception and motor activation to allow for proper function of core, proximal hip and LE with self care and ADLs  [x] (52772) Gait Re-education- Provided training and instruction to the patient for proper LE, core and proximal hip recruitment and positioning and eccentric body weight control with ambulation re-education including up and down stairs     Home Exercise Program:    [x] (35430) Reviewed/Progressed HEP activities related to strengthening, flexibility, endurance, ROM of core, proximal hip and LE for functional self-care, mobility, lifting and ambulation/stair navigation   [] (84722)Reviewed/Progressed HEP activities related to improving balance, coordination, kinesthetic sense, posture, motor skill, proprioception of core, proximal hip and LE for self care, mobility, lifting, and ambulation/stair navigation      Manual Treatments:  PROM / STM / Oscillations-Mobs:  G-I, II, III, IV (PA's, Inf., Post.)  [x] (76663) Provided manual therapy to mobilize LE, proximal hip and/or LS spine soft tissue/joints for the purpose of modulating pain, promoting relaxation,  increasing ROM, reducing/eliminating soft tissue swelling/inflammation/restriction, improving soft tissue extensibility and allowing for proper ROM for normal function with self care, mobility, lifting and ambulation.      Modalities:  Declined, will ice at home    Charges:  Timed Code Treatment Minutes: 35   Total Treatment Minutes: 45 (indep exercise, bike)     [] EVAL (LOW) 34363 (typically 20 minutes face-to-face)  [] EVAL (MOD) 74984 (typically 30 minutes face-to-face)  [] EVAL (HIGH) 86729 (typically 45 minutes face-to-face)  [] RE-EVAL     [x] WK(12802) x  1   [] IONTO  [] NMR (13285) x      [] VASO concurrent with ES  [x] Manual (45219) x  1    [] Other:  [] TA x       [] Mech Traction (84373)  [] ES(attended) (21737)      [] ES (un) (65249):     GOALS: Patient stated goal:Get back to normal, be able to golf, walk distances, kneel on the floor     Therapist goals for Patient:   Short Term Goals: To be achieved in: 2 weeks  1. Independent in HEP and progression per patient tolerance, in order to prevent re-injury. met  2. Patient will have a decrease in pain to facilitate improvement in movement, function, and ADLs as indicated by Functional Deficits. met     Long Term Goals: To be achieved in: 8 weeks  1. Disability index score of 25% or less for the LEFS to assist with reaching prior level of function. progressing  2. Patient will demonstrate increased L knee AROM to 0-115 to allow for proper joint functioning for normal gait, reciprocal stair amb, squatting and kneeling. progressing  3. Patient will demonstrate an increase in Strength to 5/5 for the right knee and hip musculature in order to be able to walk prolonged distances, squat, and do heavier household work. progressing   4. Patient will return to walking at least 1 mile with a normal gait pattern and back to his part-time job without limitations. progressing  5. Patient will have improved balance to 20\" on the RLE to prevent future falls. 90%   6. Pt will be able to descend stairs reciprocally with one railing. Progression Towards Functional goals:  [x] Patient is progressing as expected towards functional goals listed. [] Progression is slowed due to complexities listed. [] Progression has been slowed due to co-morbidities.   [] Plan just implemented, too soon to assess goals progression  [] Other:

## 2019-06-06 ENCOUNTER — APPOINTMENT (OUTPATIENT)
Dept: PHYSICAL THERAPY | Age: 69
End: 2019-06-06
Payer: COMMERCIAL

## 2019-06-06 NOTE — PROGRESS NOTES
[facility], [address]  Phone: [phone]  Fax [phone]     Physical Therapy Re-Certification Plan of Care    Dear Referring Practitioner: NURY Hutchins,    We had the pleasure of treating the following patient for physical therapy services at [address] [place] [address]. A summary of our findings can be found in the updated assessment below. This includes our plan of care. If you have any questions or concerns regarding these findings, please do not hesitate to contact me at the office phone number checked above. Thank you for the referral.     Physician Signature:________________________________Date:__________________  By signing above, therapists plan is approved by physician      Patient: Ivone Tam   : 1950   MRN: [id number]  Referring Physician: Referring Practitioner: NURY Hutchins      Evaluation Date: 2019      Medical Diagnosis Information:  · Diagnosis: M17.9 Degenerative joint disease, knee, right; R TKR 19   · Treatment Diagnosis: M25.561 Right knee pain; M25.661 Right knee stiffness; R26.2 Difficulty in walking   Insurance information: PT Insurance Information: [employer] [facility]    Date Range:19-19  Total visits:14     G-Codes: (if applicable)   81% disability  Functional Index used: LEFS    SUBJECTIVE: Pt feels good overall. Has difficulty descending stairs (still amb step-to) and has difficulty getting up out of chairs. He does not have pain in his knee. He can walk as far as he needs to and also played 3 holes of golf yesterday.          Current Pain Scale: 0/10    Type: []Constant   [x]Intermitment  []Radiating []Localized  []other:     Functional Limitations: []Sitting []Standing []Walking    []Squatting [x]Stairs            []ADL's  []Driving []Sports/Recreation  [x]Other:getting up out of chair    · OBJECTIVE:Knee extension- flexion 0-112 overall function and possible falls risk   []pain/difficulty with ambulation- reduced overall function and mobility   []unable to perform sport/recreational activity due to pain and dysfunction   []other:       Prognosis/Rehab Potential:    []Excellent   [x]Good    []Fair   []Poor: Toleration of evaluation or treatment:    []Excellent   [x]Good    []Fair   []Poor     New or Updated Goals (if applicable):  [x] No change to goals established upon initial eval/last progress note:  New Goals: 6: Pt will be able to descend stairs reciprocally with one railing. GOALS: Patient stated goal:Get back to normal, be able to golf, walk distances, kneel on the floor     Therapist goals for Patient:   Short Term Goals: To be achieved in: 2 weeks  1. Independent in HEP and progression per patient tolerance, in order to prevent re-injury. met  2. Patient will have a decrease in pain to facilitate improvement in movement, function, and ADLs as indicated by Functional Deficits. met     Long Term Goals: To be achieved in: 8 weeks  1. Disability index score of 25% or less for the LEFS to assist with reaching prior level of function. progressing  2. Patient will demonstrate increased L knee AROM to 0-115 to allow for proper joint functioning for normal gait, reciprocal stair amb, squatting and kneeling. progressing  3. Patient will demonstrate an increase in Strength to 5/5 for the right knee and hip musculature in order to be able to walk prolonged distances, squat, and do heavier household work. progressing   4. Patient will return to walking at least 1 mile with a normal gait pattern and back to his part-time job without limitations. progressing  5. Patient will have improved balance to 20\" on the RLE to prevent future falls. 90%     Rehab Potential:   []Excellent   [x] Good   [] Fair   [] Poor    Plan of Care:  [x] Continue Current Therapy Intervention    Frequency/Duration: 1-2 days per week for 3 Weeks:  HEP instruction:   1. Ther ex including: strength training, ROM, NMR and proprioception for the proximal hip, core and Lower extremity  2. Manual therapy as indicated including Dry Needling/IASTM, STM, PROM, Gr I-IV mobilizations, spinal mobilization/manipulation. 3. Modalities as needed including: thermal agents, E-stim, US, iontophoresis as indicated. 4. Patient education on joint protection, activity modification, progression of HEP.         Electronically signed by:  Jay Chavira PT, DPT

## 2019-06-07 ENCOUNTER — APPOINTMENT (OUTPATIENT)
Dept: PHYSICAL THERAPY | Age: 69
End: 2019-06-07
Payer: COMMERCIAL

## 2019-06-12 ENCOUNTER — HOSPITAL ENCOUNTER (OUTPATIENT)
Dept: PHYSICAL THERAPY | Age: 69
Setting detail: THERAPIES SERIES
Discharge: HOME | End: 2019-06-12
Payer: COMMERCIAL

## 2019-06-12 PROCEDURE — 97110 THERAPEUTIC EXERCISES: CPT

## 2019-06-12 PROCEDURE — 97140 MANUAL THERAPY 1/> REGIONS: CPT

## 2019-06-12 NOTE — FLOWSHEET NOTE
[facility], [address]  Phone: [phone]  Fax [phone]    Physical Therapy Daily Treatment Note  Date:  2019    Patient Name:  Brent Dubois    :  1950  MRN: [id number]  Restrictions/Precautions:    Medical/Treatment Diagnosis Information:  · Diagnosis: M17.9 Degenerative joint disease, knee, right; R TKR 19  · Treatment Diagnosis: M25.561 Right knee pain; M25.661 Right knee stiffness; R26.2 Difficulty in walking  Insurance/Certification information:  PT Insurance Information: [facility]  Physician Information:  Referring Practitioner: NURY Diaz  Plan of care signed (Y/N):     Date of Patient follow up with Physician:     G-Code (if applicable):      Date G-Code Applied:  19  PT G-Codes  Functional Assessment Tool Used: LEFS  Score: 31  Functional Limitation: Mobility: Walking and moving around  Mobility: Walking and Moving Around Current Status (): At least 20 percent but less than 40 percent impaired, limited or restricted  Mobility: Walking and Moving Around Goal Status (): At least 20 percent but less than 40 percent impaired, limited or restricted    Progress Note: [x]  Yes  []  No  Next due by: Visit #19     Latex Allergy:  [x]NO      []YES  Preferred Language for Healthcare:   [x]English       []other:    Visit # Insurance Allowable Requires auth   15- PN written BMN    [x]no        []yes:       Pain level:  0/10     SUBJECTIVE: Pt denies pain in his knee, but states his back and hips are a bit stiff from going back to the chiropractor.      OBJECTIVE:   Observation: incision healed, good resciprocal amb pattern  Test measurements:  Knee AAROM 0-116           RESTRICTIONS/PRECAUTIONS: R TKR 2019, R THR     Exercises/Interventions:     Therapeutic Ex Sets/reps Notes HEP   Pt ed: still stretch 3x/day even on days in therapy, probably over-did activity on Sat- walking up hills, prolonged sitting, plus pushing [employer] cart; still progressing well.  He is stiff into flexion, but will continue to improve if he is diligent about stretching  POC and remaining deficits               5'     Star stretch EOB 60\"x2  X   IT band stretch c strap 60\"x2 Manual today X   Step stretch on staircase  Heel slide with strap on SWB  On mat 5\"x10  \" indep      X   HS stretch on step 60\"x2 indep X   Calf stretch standing on incline board 30\"x3 indep X   Prone quad S with strap 30\"x4     Prone hang  3# not needed this visit X   Quad set  X   SAQ  5#    LAQ with VMO squeeze 4#    SLR-  2x102# QS each time X   SL hip abd 2x102# A for not rolling    clamshell blue X   Bridge  SL bridge with CL LE kick 3\"x10  3\" [id number] hips down into more knee flex     Step up fwd to Danie [place] 8\"    LSD 6\"  Up and over x10 ea    x15   x10   cues to avoid knee valgus    UE support  6\"      Sit to stand- chair   With RLE slightly back x10  x10  x10 X   Side step 4x40' GVL at ankles X    RACHEL, LP, HS curl, LAQ  See AT note Include health program started    Bike  5' I exc; Able to revolve    EZ stretch flex,   indep     Manual Intervention      Patella mobs- all 20x ea     Post tib mob GII      STM to quad and IT band  Roller to IT band  Manual ITB S 10'   Pt requested no SASTM    PROM flexion- seated,  and  10\"x5 ea                 NMR re-education      Gait training s AD- focus on heel-toe gait      SLS airex 10\"x 10 R,Oc pillow at home X   Tandem balance on Airex EC      Resisted gait 4 way in CC X10/x10  x5 R/L ea 50# retro, 40# forward  30# lat With AT                   Therapeutic Exercise and NMR EXR  [x] (55446) Provided verbal/tactile cueing for activities related to strengthening, flexibility, endurance, ROM for improvements in LE, proximal hip, and core control with self care, mobility, lifting, ambulation.  [] (73228) Provided verbal/tactile cueing for activities related to improving balance, coordination, kinesthetic sense, posture, motor skill, proprioception  to assist with LE, proximal hip, and core control in self care, mobility, lifting, ambulation and eccentric single leg control. NMR and Therapeutic Activities:    [] (86507 or 52775) Provided verbal/tactile cueing for activities related to improving balance, coordination, kinesthetic sense, posture, motor skill, proprioception and motor activation to allow for proper function of core, proximal hip and LE with self care and ADLs  [x] (13207) Gait Re-education- Provided training and instruction to the patient for proper LE, core and proximal hip recruitment and positioning and eccentric body weight control with ambulation re-education including up and down stairs     Home Exercise Program:    [x] (47906) Reviewed/Progressed HEP activities related to strengthening, flexibility, endurance, ROM of core, proximal hip and LE for functional self-care, mobility, lifting and ambulation/stair navigation   [] (82299)Reviewed/Progressed HEP activities related to improving balance, coordination, kinesthetic sense, posture, motor skill, proprioception of core, proximal hip and LE for self care, mobility, lifting, and ambulation/stair navigation      Manual Treatments:  PROM / STM / Oscillations-Mobs:  G-I, II, III, IV (PA's, Inf., Post.)  [x] (70139) Provided manual therapy to mobilize LE, proximal hip and/or LS spine soft tissue/joints for the purpose of modulating pain, promoting relaxation,  increasing ROM, reducing/eliminating soft tissue swelling/inflammation/restriction, improving soft tissue extensibility and allowing for proper ROM for normal function with self care, mobility, lifting and ambulation.      Modalities:  Declined, will ice at home    Charges:  Timed Code Treatment Minutes: 45   Total Treatment Minutes: 55 (I exc)     [] EVAL (LOW) 33122 (typically 20 minutes face-to-face)  [] EVAL (MOD) 75840 (typically 30 minutes face-to-face)  [] EVAL (HIGH) 92606 (typically 45 minutes face-to-face)  [] RE-EVAL     [x] BH(40601) x  2   [] IONTO  [] NMR (23312) x      [] VASO concurrent with ES  [x] Manual (36998) x  1    [] Other:  [] TA x       [] Mech Traction (26373)  [] ES(attended) (54070)      [] ES (un) (55257):     GOALS: Patient stated goal:Get back to normal, be able to golf, walk distances, kneel on the floor     Therapist goals for Patient:   Short Term Goals: To be achieved in: 2 weeks  1. Independent in HEP and progression per patient tolerance, in order to prevent re-injury. met  2. Patient will have a decrease in pain to facilitate improvement in movement, function, and ADLs as indicated by Functional Deficits. met     Long Term Goals: To be achieved in: 8 weeks  1. Disability index score of 25% or less for the LEFS to assist with reaching prior level of function. progressing  2. Patient will demonstrate increased L knee AROM to 0-115 to allow for proper joint functioning for normal gait, reciprocal stair amb, squatting and kneeling. progressing  3. Patient will demonstrate an increase in Strength to 5/5 for the right knee and hip musculature in order to be able to walk prolonged distances, squat, and do heavier household work. progressing   4. Patient will return to walking at least 1 mile with a normal gait pattern and back to his part-time job without limitations. progressing  5. Patient will have improved balance to 20\" on the RLE to prevent future falls. 90%   6. Pt will be able to descend stairs reciprocally with one railing. Progression Towards Functional goals:  [x] Patient is progressing as expected towards functional goals listed. [] Progression is slowed due to complexities listed. [] Progression has been slowed due to co-morbidities. [] Plan just implemented, too soon to assess goals progression  [] Other:     ASSESSMENT: Pt is on target with PT goals, he can dec frequency to 1x/week.     Treatment/Activity Tolerance:  [x] Patient tolerated treatment well [] Patient limited by fatique  [] Patient limited by pain  [] Patient limited by other medical complications  [] Other:     Prognosis: [x] Good [] Fair  [] Poor    Patient Requires Follow-up: [x] Yes  [] No     PLAN: Flexion ROM. Cont PT 1x/week x 3 weeks, unless ROM regresses, then 2x/week again.    [x] Continue per plan of care [] Alter current plan (see comments)  [] Plan of care initiated [] Hold pending MD visit [] Discharge    Electronically signed by: Sridevi Boyle, PT, DPT

## 2019-06-20 ENCOUNTER — APPOINTMENT (OUTPATIENT)
Dept: PHYSICAL THERAPY | Age: 69
End: 2019-06-20
Payer: COMMERCIAL

## 2019-06-21 ENCOUNTER — HOSPITAL ENCOUNTER (OUTPATIENT)
Dept: PHYSICAL THERAPY | Age: 69
Setting detail: THERAPIES SERIES
Discharge: HOME | End: 2019-06-21
Payer: COMMERCIAL

## 2019-06-21 PROCEDURE — 97140 MANUAL THERAPY 1/> REGIONS: CPT

## 2019-06-21 PROCEDURE — 97110 THERAPEUTIC EXERCISES: CPT

## 2019-06-21 NOTE — FLOWSHEET NOTE
[facility], [address] [place]  Phone: [phone]  Fax [phone]    Physical Therapy Daily Treatment Note  Date:  2019    Patient Name:  Kristi Dean    :  1950  MRN: [id number]  Restrictions/Precautions:    Medical/Treatment Diagnosis Information:  · Diagnosis: M17.9 Degenerative joint disease, knee, right; R TKR 19  · Treatment Diagnosis: M25.561 Right knee pain; M25.661 Right knee stiffness; R26.2 Difficulty in walking  Insurance/Certification information:  PT Insurance Information: [facility]  Physician Information:  Referring Practitioner: NURY Feliciano  Plan of care signed (Y/N):     Date of Patient follow up with Physician:     G-Code (if applicable):      Date G-Code Applied:  19  PT G-Codes  Functional Assessment Tool Used: LEFS  Score: 31  Functional Limitation: Mobility: Walking and moving around  Mobility: Walking and Moving Around Current Status (): At least 20 percent but less than 40 percent impaired, limited or restricted  Mobility: Walking and Moving Around Goal Status (): At least 20 percent but less than 40 percent impaired, limited or restricted    Progress Note: [x]  Yes  []  No  Next due by: Visit #19     Latex Allergy:  [x]NO      []YES  Preferred Language for Healthcare:   [x]English       []other:    Visit # Insurance Allowable Requires auth   16 BMN    [x]no        []yes:       Pain level:  0/10     SUBJECTIVE: Pt denies pain in his knee, but has stiffness when he bends his knee still. This is worse some days than others. He has not been to the [facility], but will start going. He thinks that he can continue indep at this time. OBJECTIVE:   Observation: incision healed, good resciprocal amb pattern  Test measurements:  Knee AAROM 0-113, after stretching 115.       MMT hip flexion R 5/5 B  Knee flexion R 5/5  Knee extension R 5/5, but reduced ecc quad control with stair descent  Hip abduction L 4+/5, R 4+/5  Hip ER R 4/5  Ankle MMT 5/5  SLS 18\"      RESTRICTIONS/PRECAUTIONS: R TKR 4/9/2019, R THR 2007    Exercises/Interventions:     Therapeutic Ex Sets/reps Notes HEP   Pt ed: still stretch 3x/day even on days in therapy, probably over-did activity on Sat- walking up hills, prolonged sitting, plus pushing [employer] cart; still progressing well.  He is stiff into flexion, but will continue to improve if he is diligent about stretching  POC and remaining deficits               5'     Prone quad stretch EOB 30\"x3  X   IT band stretch c strap 60\"x2 Manual today X   Step stretch on staircase  Heel slide with strap on SWB  On mat 5\"x10  -  5\"x5 indep      X   HS stretch on step 60\"x2 indep X   Calf stretch standing on incline board 30\"x3 indep X         Prone hang  3# not needed this visit X   Quad set  X   SAQ  5#    LAQ with VMO squeeze 4#    SLR-  2# QS each time X   SL hip abd R,L 2x10 for not rolling    Clamshell R,L 20xblue X   Bridge  SL bridge with CL LE kick 3\"x10  3\" [id number] TB around knees  \"    Step up fwd to Danie Alexander 8\"    LSD 6\"  Up and over x15 ea       x15   cues to avoid knee valgus    UE support  8\"      Sit to stand- chair   With RLE slightly back  X   Side step 4x40' Blue TB at ankles X    RACHEL, LP, HS curl, LAQ   Include health program started    Bike   I exc; Able to revolve    EZ stretch flex,   indep     Manual Intervention      Patella mobs- all 20x ea     Post tib mob GII      STM to quad and IT band  Roller to IT band  Manual ITB S 10'   Pt requested no SASTM    PROM flexion- seated,  and  10\"x5 ea                 NMR re-education      Gait training s AD- focus on stable core and glute activation to decrease lateral lean 50'     SLS airex On pillow at home X   Tandem balance on Airex EC     Resisted gait 4 way in CC 50# retro, 40# forward  30# lat With AT                   Therapeutic Exercise and NMR EXR  [x] (23337) Provided verbal/tactile cueing for activities related to strengthening, flexibility, endurance, ROM for improvements in LE, proximal hip, and core control with self care, mobility, lifting, ambulation.  [] (92300) Provided verbal/tactile cueing for activities related to improving balance, coordination, kinesthetic sense, posture, motor skill, proprioception  to assist with LE, proximal hip, and core control in self care, mobility, lifting, ambulation and eccentric single leg control.      NMR and Therapeutic Activities:    [] (71479 or 40612) Provided verbal/tactile cueing for activities related to improving balance, coordination, kinesthetic sense, posture, motor skill, proprioception and motor activation to allow for proper function of core, proximal hip and LE with self care and ADLs  [x] (34574) Gait Re-education- Provided training and instruction to the patient for proper LE, core and proximal hip recruitment and positioning and eccentric body weight control with ambulation re-education including up and down stairs     Home Exercise Program:    [x] (75293) Reviewed/Progressed HEP activities related to strengthening, flexibility, endurance, ROM of core, proximal hip and LE for functional self-care, mobility, lifting and ambulation/stair navigation   [] (35075)Reviewed/Progressed HEP activities related to improving balance, coordination, kinesthetic sense, posture, motor skill, proprioception of core, proximal hip and LE for self care, mobility, lifting, and ambulation/stair navigation      Manual Treatments:  PROM / STM / Oscillations-Mobs:  G-I, II, III, IV (PA's, Inf., Post.)  [x] (58332) Provided manual therapy to mobilize LE, proximal hip and/or LS spine soft tissue/joints for the purpose of modulating pain, promoting relaxation,  increasing ROM, reducing/eliminating soft tissue swelling/inflammation/restriction, improving soft tissue extensibility and allowing for proper ROM for normal function with self care, mobility, lifting and ambulation. Modalities:  Declined, will ice at home    Charges:  Timed Code Treatment Minutes: 45   Total Treatment Minutes: 55 (I exc)     [] EVAL (LOW) 89048 (typically 20 minutes face-to-face)  [] EVAL (MOD) 59757 (typically 30 minutes face-to-face)  [] EVAL (HIGH) 73617 (typically 45 minutes face-to-face)  [] RE-EVAL     [x] RP(20240) x  2   [] IONTO  [] NMR (76249) x      [] VASO concurrent with ES  [x] Manual (04060) x  1    [] Other:  [] TA x       [] Mech Traction (36637)  [] ES(attended) (94753)      [] ES (un) (30685):     GOALS: Patient stated goal:Get back to normal, be able to golf, walk distances, kneel on the floor     Therapist goals for Patient:   Short Term Goals: To be achieved in: 2 weeks  1. Independent in HEP and progression per patient tolerance, in order to prevent re-injury. met  2. Patient will have a decrease in pain to facilitate improvement in movement, function, and ADLs as indicated by Functional Deficits. met     Long Term Goals: To be achieved in: 8 weeks  1. Disability index score of 25% or less for the LEFS to assist with reaching prior level of function. progressing  2. Patient will demonstrate increased L knee AROM to 0-115 to allow for proper joint functioning for normal gait, reciprocal stair amb, squatting and kneeling. met  3. Patient will demonstrate an increase in Strength to 5/5 for the right knee and hip musculature in order to be able to walk prolonged distances, squat, and do heavier household work. nearly met  4. Patient will return to walking at least 1 mile with a normal gait pattern and back to his part-time job without limitations. able to complete job without limitations, able to walk . 5-1 mile per pt estimate  5. Patient will have improved balance to 20\" on the RLE to prevent future falls. 90%   6.  Pt will be able to descend stairs reciprocally with one railing. met    Progression Towards Functional goals:  [x] Patient is progressing as expected towards functional goals listed. [] Progression is slowed due to complexities listed. [] Progression has been slowed due to co-morbidities. [] Plan just implemented, too soon to assess goals progression  [] Other:     ASSESSMENT: Pt has made adequate progress toward his goals and may continue with his gym program and HEP. He has mild deficits in hip strength bilaterally still, understands to focus on this muscle group with exercises.       Treatment/Activity Tolerance:  [x] Patient tolerated treatment well [] Patient limited by fatique  [] Patient limited by pain  [] Patient limited by other medical complications  [] Other:     Prognosis: [x] Good [] Fair  [] Poor    Patient Requires Follow-up: [x] Yes  [] No     PLAN:   [] Continue per plan of care [] Alter current plan (see comments)  [] Plan of care initiated [] Hold pending MD visit [x] Discharge    Electronically signed by: Alyssa Ang, PT, DPT

## 2019-06-21 NOTE — DISCHARGE SUMMARY
[facility],  [address] [place]  Phone: [phone]  Fax [phone]       Physical Therapy Discharge  Date: 2019        Patient Name:  Lita Gonzalez    :  1950  MRN: [id number]  Referring Physician: NURY Yang, Sara Fu MD  · Diagnosis:      Diagnosis: M17.9 Degenerative joint disease, knee, right; R TKR 19  ·  ICD Code:M17.9  [x] Surgical [] Conservative   · Therapy Diagnosis/Practice Pattern: M25.561 Right knee pain; M25.661 Right knee stiffness; R26.2 Difficulty in walking/ H    Number of Comorbidities:  []0     [x]1-2    []3+  Total number of visits: 16   Reporting Period:   Beginning Date:19   End Date:19    OBJECTIVE  Test used Initial score Discharge Score   Pain Summary VAS 0-6/10 0/10   Functional questionnaire LEFS 44% 31%   Functional Testing SLS L 17\", R 6\" L 18\", R 18\"         ROM      Knee flexion  L 115, R 73 R 113   Knee extension   L 1 R lacking 5 R 0   Strength      Knee flexion  L 5, R 4+ 5 B   Knee extension  L 5, R 3 5 B   Hip abduction  NT 4+ B        Functional Limitation G-Code (if applicable):           40%  Test/tests used to determine % limitation: LEFS  Actual Score used to drive % [id number]:74/33    Treatment to date:  [x] Therapeutic Exercise    [x] Modalities:  [] Therapeutic Activity             []Ultrasound            [x]Electrical Stimulation  [x] Gait Training     []Cervical Traction    [] Lumbar Traction  [x] Neuromuscular Re-education [x] Cold/hotpack         []Iontophoresis  [x] Instruction in HEP      Other:  [x] Manual Therapy                   [x]vaso-pneumatic therapy                       ? []   Assessment:  [x] All Goals were achieved.   [] The following goals were achieved (#'s):  [] The following goals were not achieved for the following reasons:/assessmen of improvement as it relates to each goal:    Plan of Care:  [x] Discharge from Therapy Services due to:goals adequately met  Reason for Discharge:   [x] All goals achieved    [] Patient having surgery  [] Physician discontinued therapy  [] Insurance/Financial Limitations [] Patient did not return for follow up visits [] Home program/1 visit only   [] No subjective or objective improvement [] Plateaued   [] Patient was unable to adhere to the plan of care established at evaluation. [] Referred back to physician for re-evaluation and did not return.      [] Other:?       Electronically signed by:  Carola Najjar, PT, DPT

## 2019-11-12 ENCOUNTER — HOSPITAL ENCOUNTER (OUTPATIENT)
Dept: PHYSICAL THERAPY | Age: 69
Setting detail: THERAPIES SERIES
Discharge: HOME | End: 2019-11-12
Payer: MEDICARE

## 2019-11-12 PROCEDURE — 97110 THERAPEUTIC EXERCISES: CPT

## 2019-11-12 PROCEDURE — G0283 ELEC STIM OTHER THAN WOUND: HCPCS

## 2019-11-12 PROCEDURE — 97161 PT EVAL LOW COMPLEX 20 MIN: CPT

## 2019-11-12 PROCEDURE — 97140 MANUAL THERAPY 1/> REGIONS: CPT

## 2019-11-14 ENCOUNTER — HOSPITAL ENCOUNTER (OUTPATIENT)
Dept: PHYSICAL THERAPY | Age: 69
Setting detail: THERAPIES SERIES
Discharge: HOME | End: 2019-11-14
Payer: MEDICARE

## 2019-11-14 PROCEDURE — G0283 ELEC STIM OTHER THAN WOUND: HCPCS

## 2019-11-14 PROCEDURE — 97110 THERAPEUTIC EXERCISES: CPT

## 2019-11-14 PROCEDURE — 97140 MANUAL THERAPY 1/> REGIONS: CPT

## 2019-11-18 ENCOUNTER — HOSPITAL ENCOUNTER (OUTPATIENT)
Dept: PHYSICAL THERAPY | Age: 69
Setting detail: THERAPIES SERIES
Discharge: HOME | End: 2019-11-18
Payer: MEDICARE

## 2019-11-18 PROCEDURE — 97140 MANUAL THERAPY 1/> REGIONS: CPT | Performed by: PHYSICAL THERAPIST

## 2019-11-18 PROCEDURE — 97110 THERAPEUTIC EXERCISES: CPT | Performed by: PHYSICAL THERAPIST

## 2019-11-18 PROCEDURE — G0283 ELEC STIM OTHER THAN WOUND: HCPCS | Performed by: PHYSICAL THERAPIST

## 2019-11-20 ENCOUNTER — HOSPITAL ENCOUNTER (OUTPATIENT)
Dept: PHYSICAL THERAPY | Age: 69
Setting detail: THERAPIES SERIES
Discharge: HOME | End: 2019-11-20
Payer: MEDICARE

## 2019-11-20 PROCEDURE — 97110 THERAPEUTIC EXERCISES: CPT | Performed by: PHYSICAL THERAPIST

## 2019-11-20 PROCEDURE — 97140 MANUAL THERAPY 1/> REGIONS: CPT | Performed by: PHYSICAL THERAPIST

## 2019-11-22 ENCOUNTER — HOSPITAL ENCOUNTER (OUTPATIENT)
Dept: PHYSICAL THERAPY | Age: 69
Setting detail: THERAPIES SERIES
Discharge: HOME | End: 2019-11-22
Payer: MEDICARE

## 2019-11-22 PROCEDURE — 97110 THERAPEUTIC EXERCISES: CPT

## 2019-11-22 PROCEDURE — 97140 MANUAL THERAPY 1/> REGIONS: CPT

## 2019-11-22 PROCEDURE — G0283 ELEC STIM OTHER THAN WOUND: HCPCS

## 2019-11-25 ENCOUNTER — HOSPITAL ENCOUNTER (OUTPATIENT)
Dept: PHYSICAL THERAPY | Age: 69
Setting detail: THERAPIES SERIES
Discharge: HOME | End: 2019-11-25
Payer: MEDICARE

## 2019-11-25 PROCEDURE — 97140 MANUAL THERAPY 1/> REGIONS: CPT

## 2019-11-25 PROCEDURE — 97110 THERAPEUTIC EXERCISES: CPT

## 2019-11-27 ENCOUNTER — HOSPITAL ENCOUNTER (OUTPATIENT)
Dept: PHYSICAL THERAPY | Age: 69
Setting detail: THERAPIES SERIES
Discharge: HOME | End: 2019-11-27
Payer: MEDICARE

## 2019-11-27 PROCEDURE — 97110 THERAPEUTIC EXERCISES: CPT

## 2019-11-27 PROCEDURE — 97140 MANUAL THERAPY 1/> REGIONS: CPT

## 2019-12-02 ENCOUNTER — HOSPITAL ENCOUNTER (OUTPATIENT)
Dept: PHYSICAL THERAPY | Age: 69
Setting detail: THERAPIES SERIES
Discharge: HOME | End: 2019-12-02
Payer: MEDICARE

## 2019-12-02 PROCEDURE — 97140 MANUAL THERAPY 1/> REGIONS: CPT

## 2019-12-02 PROCEDURE — 97110 THERAPEUTIC EXERCISES: CPT

## 2019-12-04 ENCOUNTER — HOSPITAL ENCOUNTER (OUTPATIENT)
Dept: PHYSICAL THERAPY | Age: 69
Setting detail: THERAPIES SERIES
Discharge: HOME | End: 2019-12-04
Payer: MEDICARE

## 2019-12-04 PROCEDURE — 97110 THERAPEUTIC EXERCISES: CPT

## 2019-12-04 PROCEDURE — 97140 MANUAL THERAPY 1/> REGIONS: CPT

## 2019-12-06 ENCOUNTER — APPOINTMENT (OUTPATIENT)
Dept: PHYSICAL THERAPY | Age: 69
End: 2019-12-06
Payer: MEDICARE

## 2019-12-09 ENCOUNTER — HOSPITAL ENCOUNTER (OUTPATIENT)
Dept: PHYSICAL THERAPY | Age: 69
Setting detail: THERAPIES SERIES
Discharge: HOME | End: 2019-12-09
Payer: MEDICARE

## 2019-12-11 ENCOUNTER — APPOINTMENT (OUTPATIENT)
Dept: PHYSICAL THERAPY | Age: 69
End: 2019-12-11
Payer: MEDICARE

## 2019-12-13 ENCOUNTER — HOSPITAL ENCOUNTER (OUTPATIENT)
Dept: PHYSICAL THERAPY | Age: 69
Setting detail: THERAPIES SERIES
Discharge: HOME | End: 2019-12-13
Payer: MEDICARE

## 2019-12-18 ENCOUNTER — HOSPITAL ENCOUNTER (OUTPATIENT)
Dept: PHYSICAL THERAPY | Age: 69
Setting detail: THERAPIES SERIES
Discharge: HOME | End: 2019-12-18
Payer: MEDICARE

## 2019-12-18 PROCEDURE — 97140 MANUAL THERAPY 1/> REGIONS: CPT

## 2019-12-18 PROCEDURE — 97110 THERAPEUTIC EXERCISES: CPT

## 2019-12-27 ENCOUNTER — APPOINTMENT (OUTPATIENT)
Dept: PHYSICAL THERAPY | Age: 69
End: 2019-12-27
Payer: MEDICARE

## 2020-01-03 ENCOUNTER — HOSPITAL ENCOUNTER (OUTPATIENT)
Dept: PHYSICAL THERAPY | Age: 70
Setting detail: THERAPIES SERIES
Discharge: HOME | End: 2020-01-03
Payer: MEDICARE

## 2020-01-03 PROCEDURE — 97110 THERAPEUTIC EXERCISES: CPT

## 2020-01-03 PROCEDURE — 97140 MANUAL THERAPY 1/> REGIONS: CPT

## 2020-01-03 NOTE — FLOWSHEET NOTE
TKR 10/29/19, R TKR 4/9/2019, R THR 2007    Exercises/Interventions:     Therapeutic Ex (17350) Sets/sec Notes/cues HEP   Pt ed: findings of exam and POC; ice; continue to use SPC; incision care      bike 5' indep     Star stretch with strap   X   Heel slides with strap  5\"x10  supine X   Standing gastroc stretch 30\"x3 Indep X   IT band stretch supine with strap  HS stretch with strap 30\"x3  30\"x3 indep X   Prone hang 3' 3# X   Quad set on 1/2 FR   X   SAQ 3#    SLR 1.5# (extensor lag noted with weight) X   LAQ 3#    SL hip abduction    X   clamshells  GVL     LBW  MW fwd  GVL below knees X   Glider abd/ext, ext 20x R,L  X   Hip hike 2x10 R,L UE support X   Wall squats w/ SB 5\"x10     Knee flexion PROM on step indep    HS stretch on steps indep          LP DL  Eccentric 2x12  2x10 100#  80#    RACHEL abd R,L  Ext  TKE 5\"x20  x20  5\" 2x10 45#  50#  70#    EZ stretch  extension  With AT     Manual Intervention (98153)      STM to quad, adductors, distal medial HS, prox gastroc, distal ITB 12'  Requested no HG today d/t soreness after LV   Patellar mobs sup and inf, med/lat 20x     PROM knee flexion supine and  10\"x5 ea  Pt joseph better  supine   Post tib/fem mobs gr III  PROM knee ext                    NMR re-education (35564)   CUES NEEDED   Cone walking with cane  4 cones  Cues for TKE and to avoid circumduction    Tandem balance R,L  FSU onto AirEx    Up and over on 8\"     15x R/L            Cues to avoid circumduction   SLS  c airex 10\"x10 R,L                                         Therapeutic Activity (41220)                                                Therapeutic Exercise and NMR EXR  [x] (05155) Provided verbal/tactile cueing for activities related to strengthening, flexibility, endurance, ROM for improvements in LE, proximal hip, and core control with self care, mobility, lifting, ambulation.   [x] (75358) Provided verbal/tactile cueing for activities related to improving balance, coordination, kinesthetic sense, posture, motor skill, proprioception  to assist with LE, proximal hip, and core control in self care, mobility, lifting, ambulation and eccentric single leg control. NMR and Therapeutic Activities:    [] (54877 or 99287) Provided verbal/tactile cueing for activities related to improving balance, coordination, kinesthetic sense, posture, motor skill, proprioception and motor activation to allow for proper function of core, proximal hip and LE with self care and ADLs  [] (71295) Gait Re-education- Provided training and instruction to the patient for proper LE, core and proximal hip recruitment and positioning and eccentric body weight control with ambulation re-education including up and down stairs     Home Exercise Program:    [x] (99650) Reviewed/Progressed HEP activities related to strengthening, flexibility, endurance, ROM of core, proximal hip and LE for functional self-care, mobility, lifting and ambulation/stair navigation   [] (29710)Reviewed/Progressed HEP activities related to improving balance, coordination, kinesthetic sense, posture, motor skill, proprioception of core, proximal hip and LE for self care, mobility, lifting, and ambulation/stair navigation      Manual Treatments:  PROM / STM / Oscillations-Mobs:  G-I, II, III, IV (PA's, Inf., Post.)  [x] (32315) Provided manual therapy to mobilize LE, proximal hip and/or LS spine soft tissue/joints for the purpose of modulating pain, promoting relaxation,  increasing ROM, reducing/eliminating soft tissue swelling/inflammation/restriction, improving soft tissue extensibility and allowing for proper ROM for normal function with self care, mobility, lifting and ambulation.      Modalities:     GAME READY (VASO)- for significant edema, swelling, pain control. +   Charges:  Timed Code Treatment Minutes: 60   Total Treatment Minutes: 71 (indep ex, breaks, bike)     [facility] time in/time out:   (and requires time in and out for each CPT code)    [] MILOAL (LOW) 45268 (typically 20 minutes face-to-face)  [] EVAL (MOD) 26136 (typically 30 minutes face-to-face)  [] EVAL (HIGH) 11318 (typically 45 minutes face-to-face)  [] RE-EVAL     [x] AP(66548) x  3   [] IONTO   [] NMR (71942) x     [] VASO   [x] Manual (99505) x  1    [] Other:  [] TA x      [] Mech Traction (12422)  [] ES(attended) (92941)      [] ES (un) (07045):     GOALS:   Patient stated goal: Get back to normal.   [x]? Progressing: []? Met: []? Not Met: []? Adjusted     Therapist goals for Patient:   Short Term Goals: To be achieved in: 2 weeks  1. Independent in HEP and progression per patient tolerance, in order to prevent re-injury. [x]? Progressing: []? Met: []? Not Met: []? Adjusted  2. Patient will have a decrease in pain to facilitate improvement in movement, function, and ADLs as indicated by Functional Deficits. [x]? Progressing: []? Met: []? Not Met: []? Adjusted     Long Term Goals: To be achieved in: 8-10 weeks  1. Disability index score of 30% or less for the LEFS to assist with reaching prior level of function. [x]? Progressing: []? Met: []? Not Met: []? Adjusted  2. Patient will demonstrate increased AROM to 0-115 deg to allow for proper joint functioning for stair ambulation, squatting, and for a normal gait pattern. [x]? Progressing: []? Met: []? Not Met: []? Adjusted  3. Patient will demonstrate an increase in Strength to 5/5 for the R,L hip and knee joints in order to ambulate s AD without gait deviations, to ambulate stairs with one railing and a reciprocal pattern. [x]? Progressing: []? Met: []? Not Met: []? Adjusted  4. Patient will return to walking 1 mile without an AD and without gait deviations. [x]? Progressing: []? Met: []? Not Met: []? Adjusted  5. Patient will be able to descend stairs with one railing reciprocally and without pain. [x]? Progressing: []? Met: []? Not Met: []?  Adjusted         Progression Towards Functional goals:  [x] Patient is progressing as expected towards functional goals listed. [] Progression is slowed due to complexities listed. [] Progression has been slowed due to co-morbidities. [] Plan just implemented, too soon to assess goals progression  [] Other:     Overall Progression Towards Functional goals/ Treatment Progress Update:  [x] Patient is progressing as expected towards functional goals listed. [] Progression is slowed due to complexities/Impairments listed. [] Progression has been slowed due to co-morbidities. [] Plan just implemented, too soon to assess goals progression <30days   [] Goals require adjustment due to lack of progress  [] Patient is not progressing as expected and requires additional follow up with physician  [] Other    Prognosis for POC: [x] Good [] Fair  [] Poor      Patient requires continued skilled intervention: [x] Yes  [] No    Treatment/Activity Tolerance:  [x] Patient able to complete treatment  [] Patient limited by fatigue  [] Patient limited by pain    [] Patient limited by other medical complications  [] Other:     Assessment: Pt was more stiff today than past session d/t not exercising while he was not in PT. Updated his HEP and encouraged him to be more consistent with exercising. Recommend that he continue with PT 1x/week x ~3 more weeks. He will d/c to a gym and HEP soon.        Return to Play: (if applicable)   [x]  Stage 1: Intro to Strength   []  Stage 2: Return to Run and Strength   []  Stage 3: Return to Jump and Strength   []  Stage 4: Dynamic Strength and Agility   []  Stage 5: Sport Specific Training     []  Ready to Return to Play, Meets All Above Stages   []  Not Ready for Return to Sports   Comments:                               PLAN:   [x] Continue per plan of care [] Alter current plan (see comments above)  [] Plan of care initiated [] Hold pending MD visit [] Discharge      Electronically signed by:  Noelle Butcher, PT, DPT    Note: If patient does not return for scheduled/ recommended follow up visits, this note will serve as a discharge from care along with most recent update on progress.

## 2020-01-08 ENCOUNTER — HOSPITAL ENCOUNTER (OUTPATIENT)
Dept: PHYSICAL THERAPY | Age: 70
Setting detail: THERAPIES SERIES
Discharge: HOME | End: 2020-01-08
Payer: MEDICARE

## 2020-01-08 PROCEDURE — 97140 MANUAL THERAPY 1/> REGIONS: CPT

## 2020-01-08 PROCEDURE — 97110 THERAPEUTIC EXERCISES: CPT

## 2020-01-08 NOTE — PROGRESS NOTES
[facility], [address]  Phone: [phone]  Fax [phone]    Physical Therapy Treatment Note/ Progress Report:           Date:  2020    Patient Name:  Alida Rojas    :  1950  MRN: [id number]  Restrictions/Precautions:    Medical/Treatment Diagnosis Information:  · Diagnosis: M17.9 Left knee osteoarthritis; L TKR DOS 10/29/19  · Treatment Diagnosis: M25.562 Pain in left knee; M25.662 Stiffness in left knee; R26.2 Difficulty in walking; M25.462 Left knee effusion  Insurance/Certification information:  PT Insurance Information: [employer]  W Jose De Jesus Ortez  Physician Information:  Referring Practitioner: Neela Marques MD/NURY Son  Has the plan of care been signed (Y/N):        [x]  Yes  []  No     Date of Patient follow up with Physician: Dec 5 2019      Is this a Progress Report:     [x]  Yes  []  No        If Yes:  Date Range for reporting period:  Beginning 19  Ending 20    Progress report will be due (10 Rx or 30 days whichever is less):   Recertification will be due (POC Duration  / 90 days whichever is less): 19      Visit # Insurance Allowable Requires auth   13- PN   BMN    [x]no        []yes:     Functional Scale: LEFS 38% disability   Date assessed:  20      Latex Allergy:  [x]NO      []YES  Preferred Language for Healthcare:   [x]English       []other:      Pain level: 1/10      SUBJECTIVE: Pt reports that he has pain off and on on the side of his knee. He is still going down the stairs sideways with two railings. He states that his family also sees him shuffling when walking and also wobbling. After his last visit, he felt really good for about 2 days and then the soreness started to come back.    OBJECTIVE:    Observation: incision scabbing still, no drainage or ss of infection  Test measurements:  Pre-tx 1-110    Post-tx 0 after prone hang and mobs-119   MMT LLE: Knee extension   4+/5, knee flexion 5/5, hip abduction 4+/5, hip flexion 5/5    RESTRICTIONS/PRECAUTIONS:  L TKR 10/29/19, R TKR 4/9/2019, R THR 2007    Exercises/Interventions:     Therapeutic Ex (44830) Sets/sec Notes/cues HEP   Pt ed: findings of exam and POC; ice; continue to use SPC; incision care      bike 5' indep     Star stretch with strap   X   Heel slides with strap  5\"x10  supine X   Standing gastroc stretch 30\"x3 Indep X   IT band stretch supine with strap  HS stretch with strap 30\"x3  30\"x3 indep X   Prone hang 3' 3# X   Quad set on 1/2 FR   X   SAQ 3#    SLR 1.5# (extensor lag noted with weight) X   LAQ 3#    SL hip abduction  2x10 R,L 2# X   clamshells 2x15 R,L GVL     LBW  MW fwd  GVL below knees X   Glider abd/ext, ext 20x R,L  X   Hip hike 2x10 R,L UE support X   Wall squats w/ SB 10\"x10     Knee flexion PROM on step indep    HS stretch on steps indep          LP DL  Eccentric 100#  80#    RACHEL abd R,L  Ext  TKE 45#  50#  70#    EZ stretch  extension  With AT     Manual Intervention (08057)      STM to quad, adductors, distal medial HS, prox gastroc, distal ITB 12'  Requested no HG today d/t soreness after LV   Patellar mobs sup and inf, med/lat 20x     PROM knee flexion supine and  10\"x5 ea  Pt joseph better  supine   Post tib/fem mobs gr III  PROM knee ext                    NMR re-education (79157)   CUES NEEDED   Cone walking with cane  4 cones  Cues for TKE and to avoid circumduction    Tandem balance R,L  FSU onto AirEx    Up and over on 8\"  Heel tap 4\"     15x R/L  2x15               SLS  c airex                                          Therapeutic Activity (22812)                                                Therapeutic Exercise and NMR EXR  [x] (98747) Provided verbal/tactile cueing for activities related to strengthening, flexibility, endurance, ROM for improvements in LE, proximal hip, and core control with self care, mobility, lifting, ambulation.   [x] (10996) Provided verbal/tactile cueing for activities related to improving balance, coordination, kinesthetic sense, posture, motor skill, proprioception  to assist with LE, proximal hip, and core control in self care, mobility, lifting, ambulation and eccentric single leg control. NMR and Therapeutic Activities:    [] (76436 or 16467) Provided verbal/tactile cueing for activities related to improving balance, coordination, kinesthetic sense, posture, motor skill, proprioception and motor activation to allow for proper function of core, proximal hip and LE with self care and ADLs  [] (82313) Gait Re-education- Provided training and instruction to the patient for proper LE, core and proximal hip recruitment and positioning and eccentric body weight control with ambulation re-education including up and down stairs     Home Exercise Program:    [x] (48472) Reviewed/Progressed HEP activities related to strengthening, flexibility, endurance, ROM of core, proximal hip and LE for functional self-care, mobility, lifting and ambulation/stair navigation   [] (50912)Reviewed/Progressed HEP activities related to improving balance, coordination, kinesthetic sense, posture, motor skill, proprioception of core, proximal hip and LE for self care, mobility, lifting, and ambulation/stair navigation      Manual Treatments:  PROM / STM / Oscillations-Mobs:  G-I, II, III, IV (PA's, Inf., Post.)  [x] (57538) Provided manual therapy to mobilize LE, proximal hip and/or LS spine soft tissue/joints for the purpose of modulating pain, promoting relaxation,  increasing ROM, reducing/eliminating soft tissue swelling/inflammation/restriction, improving soft tissue extensibility and allowing for proper ROM for normal function with self care, mobility, lifting and ambulation.      Modalities:     GAME READY (VASO)- for significant edema, swelling, pain control. +   Charges:  Timed Code Treatment Minutes: 60   Total Treatment Minutes: 67 (indep ex, breaks, bike)     C time in/time out:   (and requires time in and out for each CPT code)    [] EVAL (LOW) 90075 (typically 20 minutes face-to-face)  [] EVAL (MOD) 80421 (typically 30 minutes face-to-face)  [] EVAL (HIGH) 62572 (typically 45 minutes face-to-face)  [] RE-EVAL     [x] JX(86634) x  3   [] IONTO   [] NMR (31864) x     [] VASO   [x] Manual (92295) x  1    [] Other:  [] TA x      [] Mech Traction (59139)  [] ES(attended) (26161)      [] ES (un) (67964):     GOALS:   Patient stated goal: Get back to normal.   [x]? Progressing: []? Met: []? Not Met: []? Adjusted     Therapist goals for Patient:   Short Term Goals: To be achieved in: 2 weeks  1. Independent in HEP and progression per patient tolerance, in order to prevent re-injury. [x]? Progressing: []? Met: []? Not Met: []? Adjusted  2. Patient will have a decrease in pain to facilitate improvement in movement, function, and ADLs as indicated by Functional Deficits. [x]? Progressing: []? Met: []? Not Met: []? Adjusted     Long Term Goals: To be achieved in: 8-10 weeks  1. Disability index score of 30% or less for the LEFS to assist with reaching prior level of function. [x]? Progressing: []? Met: []? Not Met: []? Adjusted  2. Patient will demonstrate increased AROM to 0-115 deg to allow for proper joint functioning for stair ambulation, squatting, and for a normal gait pattern. [x]? Progressing: []? Met: []? Not Met: []? Adjusted  3. Patient will demonstrate an increase in Strength to 5/5 for the R,L hip and knee joints in order to ambulate s AD without gait deviations, to ambulate stairs with one railing and a reciprocal pattern. [x]? Progressing: []? Met: []? Not Met: []? Adjusted  4. Patient will return to walking 1 mile without an AD and without gait deviations. [x]? Progressing: []? Met: []? Not Met: []? Adjusted  5. Patient will be able to descend stairs with one railing reciprocally and without pain. [x]? Progressing: []? Met: []? Not Met: []?  Adjusted Progression Towards Functional goals:  [x] Patient is progressing as expected towards functional goals listed. [] Progression is slowed due to complexities listed. [] Progression has been slowed due to co-morbidities. [] Plan just implemented, too soon to assess goals progression  [] Other:     Overall Progression Towards Functional goals/ Treatment Progress Update:  [x] Patient is progressing as expected towards functional goals listed. [] Progression is slowed due to complexities/Impairments listed. [] Progression has been slowed due to co-morbidities. [] Plan just implemented, too soon to assess goals progression <30days   [] Goals require adjustment due to lack of progress  [] Patient is not progressing as expected and requires additional follow up with physician  [] Other    Prognosis for POC: [x] Good [] Fair  [] Poor      Patient requires continued skilled intervention: [x] Yes  [] No    Treatment/Activity Tolerance:  [x] Patient able to complete treatment  [] Patient limited by fatigue  [] Patient limited by pain    [] Patient limited by other medical complications  [] Other:     Assessment: Pt was short on time this visit, so no strengthening on weight machines, but had had better tolerance to step activities compared to LV. Cues needed for SL hip work to keep pelvis stable. Return to Play: (if applicable)   [x]  Stage 1: Intro to Strength   []  Stage 2: Return to Run and Strength   []  Stage 3: Return to Jump and Strength   []  Stage 4: Dynamic Strength and Agility   []  Stage 5: Sport Specific Training     []  Ready to Return to Play, Meets All Above Stages   []  Not Ready for Return to Sports   Comments:                               PLAN: Continue 1x/week x 2 more weeks, then re-assess.    [x] Continue per plan of care [] Alter current plan (see comments above)  [] Plan of care initiated [] Hold pending MD visit [] Discharge      Electronically signed by:  Jeff Magallanes PT,

## 2020-01-13 ENCOUNTER — HOSPITAL ENCOUNTER (OUTPATIENT)
Dept: PHYSICAL THERAPY | Age: 70
Setting detail: THERAPIES SERIES
Discharge: HOME | End: 2020-01-13
Payer: MEDICARE

## 2020-01-13 PROCEDURE — 97110 THERAPEUTIC EXERCISES: CPT

## 2020-01-13 PROCEDURE — 97140 MANUAL THERAPY 1/> REGIONS: CPT

## 2020-01-13 NOTE — FLOWSHEET NOTE
ambulation. [x] (22111) Provided verbal/tactile cueing for activities related to improving balance, coordination, kinesthetic sense, posture, motor skill, proprioception  to assist with LE, proximal hip, and core control in self care, mobility, lifting, ambulation and eccentric single leg control. NMR and Therapeutic Activities:    [] (48542 or 85384) Provided verbal/tactile cueing for activities related to improving balance, coordination, kinesthetic sense, posture, motor skill, proprioception and motor activation to allow for proper function of core, proximal hip and LE with self care and ADLs  [] (35011) Gait Re-education- Provided training and instruction to the patient for proper LE, core and proximal hip recruitment and positioning and eccentric body weight control with ambulation re-education including up and down stairs     Home Exercise Program:    [x] (19652) Reviewed/Progressed HEP activities related to strengthening, flexibility, endurance, ROM of core, proximal hip and LE for functional self-care, mobility, lifting and ambulation/stair navigation   [] (84259)Reviewed/Progressed HEP activities related to improving balance, coordination, kinesthetic sense, posture, motor skill, proprioception of core, proximal hip and LE for self care, mobility, lifting, and ambulation/stair navigation      Manual Treatments:  PROM / STM / Oscillations-Mobs:  G-I, II, III, IV (PA's, Inf., Post.)  [x] (60975) Provided manual therapy to mobilize LE, proximal hip and/or LS spine soft tissue/joints for the purpose of modulating pain, promoting relaxation,  increasing ROM, reducing/eliminating soft tissue swelling/inflammation/restriction, improving soft tissue extensibility and allowing for proper ROM for normal function with self care, mobility, lifting and ambulation.      Modalities:     GAME READY (VASO)- for significant edema, swelling, pain control. +   Charges:  Timed Code Treatment Minutes: 40   Total Treatment Minutes: 45 (bike)     [facility] time in/time out:   (and requires time in and out for each CPT code)    [] EVAL (LOW) 39998 (typically 20 minutes face-to-face)  [] EVAL (MOD) 94127 (typically 30 minutes face-to-face)  [] EVAL (HIGH) 59896 (typically 45 minutes face-to-face)  [] RE-EVAL     [x] SS(75792) x  2   [] IONTO   [] NMR (00512) x     [] VASO   [x] Manual (85712) x  1    [] Other:  [] TA x      [] Mech Traction (34989)  [] ES(attended) (29736)      [] ES (un) (45966):     GOALS:   Patient stated goal: Get back to normal.   []? Progressing: [x]? Met: []? Not Met: []? Adjusted     Therapist goals for Patient:   Short Term Goals: To be achieved in: 2 weeks  1. Independent in HEP and progression per patient tolerance, in order to prevent re-injury. []? Progressing: [x]? Met: []? Not Met: []? Adjusted  2. Patient will have a decrease in pain to facilitate improvement in movement, function, and ADLs as indicated by Functional Deficits. []? Progressing: [x]? Met: []? Not Met: []? Adjusted     Long Term Goals: To be achieved in: 8-10 weeks  1. Disability index score of 30% or less for the LEFS to assist with reaching prior level of function. [x]? Progressing: []? Met: []? Not Met: []? Adjusted  2. Patient will demonstrate increased AROM to 0-115 deg to allow for proper joint functioning for stair ambulation, squatting, and for a normal gait pattern. nearly met- 1-115  [x]? Progressing: []? Met: []? Not Met: []? Adjusted  3. Patient will demonstrate an increase in Strength to 5/5 for the R,L hip and knee joints in order to ambulate s AD without gait deviations, to ambulate stairs with one railing and a reciprocal pattern. [x]? Progressing: []? Met: []? Not Met: []? Adjusted  4. Patient will return to walking 1 mile without an AD and without gait deviations. [x]? Progressing: []? Met: []? Not Met: []? Adjusted  5. Patient will be able to descend stairs with one railing reciprocally and without pain. [x]? Progressing: []? Met: []? Not Met: []? Adjusted         Progression Towards Functional goals:  [x] Patient is progressing as expected towards functional goals listed. [] Progression is slowed due to complexities listed. [] Progression has been slowed due to co-morbidities. [] Plan just implemented, too soon to assess goals progression  [] Other:     Overall Progression Towards Functional goals/ Treatment Progress Update:  [x] Patient is progressing as expected towards functional goals listed. [] Progression is slowed due to complexities/Impairments listed. [] Progression has been slowed due to co-morbidities. [] Plan just implemented, too soon to assess goals progression <30days   [] Goals require adjustment due to lack of progress  [] Patient is not progressing as expected and requires additional follow up with physician  [] Other    Prognosis for POC: [x] Good [] Fair  [] Poor      Patient requires continued skilled intervention: [x] Yes  [] No    Treatment/Activity Tolerance:  [x] Patient able to complete treatment  [] Patient limited by fatigue  [] Patient limited by pain    [] Patient limited by other medical complications  [] Other:     Assessment: Eliana Riggs has made adequate progress toward his goals. He understands that he will need to continue to build strength using his HEP and gym program in order to improve tolerance to transitions and stair descent. Pt's chart will be on hold x 30 days- he may return if needed within that time. Otherwise, his case will be considered d/c if he does not return during that period.      Return to Play: (if applicable)   [x]  Stage 1: Intro to Strength   []  Stage 2: Return to Run and Strength   []  Stage 3: Return to Jump and Strength   []  Stage 4: Dynamic Strength and Agility   []  Stage 5: Sport Specific Training     []  Ready to Return to Play, Meets All Above Stages   []  Not Ready for Return to Sports   Comments:                               PLAN: indep HEP x